# Patient Record
Sex: MALE | Race: WHITE
[De-identification: names, ages, dates, MRNs, and addresses within clinical notes are randomized per-mention and may not be internally consistent; named-entity substitution may affect disease eponyms.]

---

## 2017-01-03 ENCOUNTER — HOSPITAL ENCOUNTER (OUTPATIENT)
Dept: HOSPITAL 17 - NEPC | Age: 52
Setting detail: OBSERVATION
LOS: 1 days | Discharge: HOME | End: 2017-01-04
Attending: INTERNAL MEDICINE | Admitting: INTERNAL MEDICINE
Payer: SELF-PAY

## 2017-01-03 VITALS
RESPIRATION RATE: 15 BRPM | OXYGEN SATURATION: 97 % | SYSTOLIC BLOOD PRESSURE: 183 MMHG | HEART RATE: 104 BPM | DIASTOLIC BLOOD PRESSURE: 91 MMHG

## 2017-01-03 VITALS
DIASTOLIC BLOOD PRESSURE: 108 MMHG | RESPIRATION RATE: 15 BRPM | HEART RATE: 90 BPM | SYSTOLIC BLOOD PRESSURE: 188 MMHG | OXYGEN SATURATION: 97 %

## 2017-01-03 VITALS
RESPIRATION RATE: 12 BRPM | DIASTOLIC BLOOD PRESSURE: 109 MMHG | OXYGEN SATURATION: 99 % | SYSTOLIC BLOOD PRESSURE: 184 MMHG | HEART RATE: 106 BPM | TEMPERATURE: 98.9 F

## 2017-01-03 VITALS
DIASTOLIC BLOOD PRESSURE: 93 MMHG | OXYGEN SATURATION: 98 % | HEART RATE: 90 BPM | TEMPERATURE: 98.2 F | SYSTOLIC BLOOD PRESSURE: 170 MMHG | RESPIRATION RATE: 20 BRPM

## 2017-01-03 VITALS — WEIGHT: 209.44 LBS | BODY MASS INDEX: 31.74 KG/M2 | HEIGHT: 68 IN

## 2017-01-03 VITALS
HEART RATE: 99 BPM | OXYGEN SATURATION: 96 % | SYSTOLIC BLOOD PRESSURE: 214 MMHG | RESPIRATION RATE: 16 BRPM | DIASTOLIC BLOOD PRESSURE: 98 MMHG

## 2017-01-03 VITALS
TEMPERATURE: 98.3 F | DIASTOLIC BLOOD PRESSURE: 70 MMHG | HEART RATE: 88 BPM | SYSTOLIC BLOOD PRESSURE: 140 MMHG | OXYGEN SATURATION: 96 % | RESPIRATION RATE: 20 BRPM

## 2017-01-03 VITALS
SYSTOLIC BLOOD PRESSURE: 177 MMHG | DIASTOLIC BLOOD PRESSURE: 117 MMHG | OXYGEN SATURATION: 98 % | HEART RATE: 101 BPM | RESPIRATION RATE: 15 BRPM

## 2017-01-03 VITALS
HEART RATE: 101 BPM | DIASTOLIC BLOOD PRESSURE: 82 MMHG | OXYGEN SATURATION: 97 % | RESPIRATION RATE: 20 BRPM | SYSTOLIC BLOOD PRESSURE: 157 MMHG

## 2017-01-03 VITALS — OXYGEN SATURATION: 97 %

## 2017-01-03 VITALS — OXYGEN SATURATION: 100 %

## 2017-01-03 VITALS — HEART RATE: 89 BPM

## 2017-01-03 DIAGNOSIS — Z79.899: ICD-10-CM

## 2017-01-03 DIAGNOSIS — F17.200: ICD-10-CM

## 2017-01-03 DIAGNOSIS — F10.10: ICD-10-CM

## 2017-01-03 DIAGNOSIS — R94.31: ICD-10-CM

## 2017-01-03 DIAGNOSIS — R00.2: ICD-10-CM

## 2017-01-03 DIAGNOSIS — R07.9: Primary | ICD-10-CM

## 2017-01-03 DIAGNOSIS — R06.02: ICD-10-CM

## 2017-01-03 DIAGNOSIS — F14.10: ICD-10-CM

## 2017-01-03 DIAGNOSIS — I10: ICD-10-CM

## 2017-01-03 LAB
ALP SERPL-CCNC: 84 U/L (ref 45–117)
ALT SERPL-CCNC: 37 U/L (ref 12–78)
AMPHETAMINE, URINE: (no result)
ANION GAP SERPL CALC-SCNC: 12 MEQ/L (ref 5–15)
APTT BLD: 28.2 SEC (ref 24.3–30.1)
AST SERPL-CCNC: 33 U/L (ref 15–37)
BARBITURATES, URINE: (no result)
BASOPHILS # BLD AUTO: 0.1 TH/MM3 (ref 0–0.2)
BASOPHILS NFR BLD: 1 % (ref 0–2)
BILIRUB SERPL-MCNC: 1 MG/DL (ref 0.2–1)
BUN SERPL-MCNC: 4 MG/DL (ref 7–18)
CHLORIDE SERPL-SCNC: 102 MEQ/L (ref 98–107)
CK MB SERPL-MCNC: 2.1 NG/ML (ref 0.5–3.6)
CK MB SERPL-MCNC: 2.7 NG/ML (ref 0.5–3.6)
CK MB SERPL-MCNC: 2.9 NG/ML (ref 0.5–3.6)
CK SERPL-CCNC: 155 U/L (ref 39–308)
CK SERPL-CCNC: 177 U/L (ref 39–308)
CK SERPL-CCNC: 218 U/L (ref 39–308)
COCAINE UR-MCNC: (no result) NG/ML
EOSINOPHIL # BLD: 0.1 TH/MM3 (ref 0–0.4)
EOSINOPHIL NFR BLD: 1 % (ref 0–4)
ERYTHROCYTE [DISTWIDTH] IN BLOOD BY AUTOMATED COUNT: 14 % (ref 11.6–17.2)
GFR SERPLBLD BASED ON 1.73 SQ M-ARVRAT: 97 ML/MIN (ref 89–?)
HCO3 BLD-SCNC: 23.4 MEQ/L (ref 21–32)
HCT VFR BLD CALC: 44.7 % (ref 39–51)
HEMO FLAGS: (no result)
INR PPP: 1 RATIO
LYMPHOCYTES # BLD AUTO: 2.2 TH/MM3 (ref 1–4.8)
LYMPHOCYTES NFR BLD AUTO: 40.5 % (ref 9–44)
MCH RBC QN AUTO: 31.8 PG (ref 27–34)
MCHC RBC AUTO-ENTMCNC: 34.4 % (ref 32–36)
MCV RBC AUTO: 92.3 FL (ref 80–100)
MONOCYTES NFR BLD: 7.7 % (ref 0–8)
NEUTROPHILS # BLD AUTO: 2.7 TH/MM3 (ref 1.8–7.7)
NEUTROPHILS NFR BLD AUTO: 49.8 % (ref 16–70)
PLATELET # BLD: 263 TH/MM3 (ref 150–450)
POTASSIUM SERPL-SCNC: 3.9 MEQ/L (ref 3.5–5.1)
PROTHROMBIN TIME: 10.7 SEC (ref 9.8–11.6)
RBC # BLD AUTO: 4.85 MIL/MM3 (ref 4.5–5.9)
SODIUM SERPL-SCNC: 137 MEQ/L (ref 136–145)
WBC # BLD AUTO: 5.3 TH/MM3 (ref 4–11)

## 2017-01-03 PROCEDURE — 85025 COMPLETE CBC W/AUTO DIFF WBC: CPT

## 2017-01-03 PROCEDURE — 80320 DRUG SCREEN QUANTALCOHOLS: CPT

## 2017-01-03 PROCEDURE — 85610 PROTHROMBIN TIME: CPT

## 2017-01-03 PROCEDURE — 84484 ASSAY OF TROPONIN QUANT: CPT

## 2017-01-03 PROCEDURE — 80307 DRUG TEST PRSMV CHEM ANLYZR: CPT

## 2017-01-03 PROCEDURE — 82552 ASSAY OF CPK IN BLOOD: CPT

## 2017-01-03 PROCEDURE — 96360 HYDRATION IV INFUSION INIT: CPT

## 2017-01-03 PROCEDURE — 99285 EMERGENCY DEPT VISIT HI MDM: CPT

## 2017-01-03 PROCEDURE — 80053 COMPREHEN METABOLIC PANEL: CPT

## 2017-01-03 PROCEDURE — 85730 THROMBOPLASTIN TIME PARTIAL: CPT

## 2017-01-03 PROCEDURE — G0378 HOSPITAL OBSERVATION PER HR: HCPCS

## 2017-01-03 PROCEDURE — 93017 CV STRESS TEST TRACING ONLY: CPT

## 2017-01-03 PROCEDURE — 71010: CPT

## 2017-01-03 PROCEDURE — 93005 ELECTROCARDIOGRAM TRACING: CPT

## 2017-01-03 PROCEDURE — 82550 ASSAY OF CK (CPK): CPT

## 2017-01-03 NOTE — RADRPT
EXAM DATE/TIME:  01/03/2017 15:52 

 

[facility] COMPARISON:     

No previous studies available for comparison.

 

                     

INDICATIONS :     

Chest pain for two days. 

                     

 

MEDICAL HISTORY :     

None.          

 

SURGICAL HISTORY :     

None.   

 

ENCOUNTER:     

Initial                                        

 

ACUITY:     

2 days      

 

PAIN SCORE:     

5/10

 

LOCATION:     

Bilateral chest 

 

FINDINGS:     

A single view of the chest demonstrates the lungs to be symmetrically aerated without evidence of mas
s, infiltrate or effusion. There is hyperaeration of both lung fields.  The cardiomediastinal contour
s are unremarkable.  Osseous structures are intact.

 

CONCLUSION:     

Normal examination for a patient of this age.  

 

 

 

 Felix Guidry MD on January 03, 2017 at 16:04           

Board Certified Radiologist.

 This report was verified electronically.

## 2017-01-03 NOTE — PD
HPI


Chief Complaint:  Medical Clearance


Time Seen by Provider:  15:37


Travel History


International Travel<30 days:  No


Contact w/Intl Traveler<30days:  No


Traveled to known affect area:  No





History of Present Illness


HPI


51-year-old male presents to the emergency department sent by [facility] 

for evaluation of hypertension.  However, he also states he has been having 

palpitations and chest heaviness for 2 days.  He states it mainly occurs at 

night.  He has associated shortness of breath.  He states this is also ongoing 

now.  Patient does admit to drinking alcohol.  He states he drank "too tall boys

" this morning due to his chest pain.  Patient also admits to using cocaine.  

He states he last snorted cocaine approximate 4-5 days ago.  He states that he 

does have a history of hypertension and is supposed to be on lisinopril, but 

does not take it.  He has reports that he is supposed to be on Lortab and Xanax 

as well as Cymbalta, is not taking medications.  Patient denies history of MI 

or cardiac history.  He denies any suicidal or homicidal ideations.  He states 

that he wants to detox and that is why he wanted to go to [facility].





PFSH


Past Medical History


Blood Disorders:  No


Anxiety:  Yes


Cancer:  No


Cardiovascular Problems:  No


Diminished Hearing:  No


Endocrine:  No


Genitourinary:  No


Hypertension:  Yes


Immune Disorder:  No


Musculoskeletal:  No


Neurologic:  No


Psychiatric:  No


Reproductive:  No


Respiratory:  No





Past Surgical History


Eye Surgery:  Yes (LEFT EYE TEAR DUCT)


Other Surgery:  Yes





Social History


Alcohol Use:  Yes (DAILY)


Tobacco Use:  Yes (2 PPD)


Substance Use:  Yes (ALCOHOL, COCAINE, BENZO)





Allergies-Medications


(Allergen,Severity, Reaction):  


Coded Allergies:  


     No Known Allergies (Verified , 11/15/16)


Reported Meds & Prescriptions





Reported Meds & Active Scripts


Active


Reported


Trazodone (Trazodone HCl) 100 Mg Tab Unknown Dose PO HS


Lisinopril 5 Mg Tab Unknown Dose PO DAILY


Cymbalta DR (Duloxetine HCl) 60 Mg Capdr 60 Mg PO DAILY


Gabapentin 300 Mg Cap 300 Mg PO TID


Vistaril (Hydroxyzine Pamoate) 50 Mg Cap Unknown Dose PO TID








Review of Systems


Except as stated in HPI:  all other systems reviewed are Neg





Physical Exam


Narrative


GENERAL: Well-developed well-nourished male patient, ambulatory.  Afebrile.  

Patient has a strong smell of alcohol on his breath.


SKIN: Warm and dry.


HEAD: Normocephalic.  Atraumatic.


EYES: No scleral icterus. No injection or drainage. 


NECK: Supple, trachea midline. No JVD or lymphadenopathy.


CARDIOVASCULAR: Regular rate and rhythm without murmurs, gallops, or rubs. 


RESPIRATORY: Breath sounds equal bilaterally. No accessory muscle use.  Lungs 

sounds clear to auscultation.


GASTROINTESTINAL: Abdomen soft, non-tender, nondistended. 


MUSCULOSKELETAL: No cyanosis, or edema. 


BACK: Nontender without obvious deformity. No CVA tenderness.





Data


Data


Last Documented VS





Vital Signs








  Date Time  Temp Pulse Resp B/P Pulse Ox O2 Delivery O2 Flow Rate FiO2


 


1/3/17 16:35  90 15 188/108 97 Room Air  


 


1/3/17 14:03 98.9       








Orders





 Electrocardiogram (1/3/17 )


Complete Blood Count With Diff (1/3/17 14:49)


Comprehensive Metabolic Panel (1/3/17 14:49)


Ckmb (Isoenzyme) Profile (1/3/17 14:49)


Troponin I (1/3/17 14:49)


Prothrombin Time / Inr (Pt) (1/3/17 14:49)


Act Partial Throm Time (Ptt) (1/3/17 14:49)


Alcohol (Ethanol) (1/3/17 14:49)


Drug Screen, Random Urine (1/3/17 14:49)


Sodium Chlor 0.9% 1000 Ml Inj (Ns 1000 M (1/3/17 15:45)


Aspirin Chew (Aspirin Chew) (1/3/17 15:45)


Chest, Single Ap (1/3/17 )


Clonidine (Catapres) (1/3/17 16:45)


CKMB (1/3/17 15:25)


CKMB% (1/3/17 15:25)





Labs








 Laboratory Tests








Test 1/3/17





 15:25


 


White Blood Count 5.3 TH/MM3


 


Red Blood Count 4.85 MIL/MM3


 


Hemoglobin 15.4 GM/DL


 


Hematocrit 44.7 %


 


Mean Corpuscular Volume 92.3 FL


 


Mean Corpuscular Hemoglobin 31.8 PG


 


Mean Corpuscular Hemoglobin 34.4 %





Concent 


 


Red Cell Distribution Width 14.0 %


 


Platelet Count 263 TH/MM3


 


Mean Platelet Volume 8.3 FL


 


Neutrophils (%) (Auto) 49.8 %


 


Lymphocytes (%) (Auto) 40.5 %


 


Monocytes (%) (Auto) 7.7 %


 


Eosinophils (%) (Auto) 1.0 %


 


Basophils (%) (Auto) 1.0 %


 


Neutrophils # (Auto) 2.7 TH/MM3


 


Lymphocytes # (Auto) 2.2 TH/MM3


 


Monocytes # (Auto) 0.4 TH/MM3


 


Eosinophils # (Auto) 0.1 TH/MM3


 


Basophils # (Auto) 0.1 TH/MM3


 


CBC Comment DIFF FINAL 


 


Differential Comment  


 


Prothrombin Time 10.7 SEC


 


Prothromb Time International 1.0 RATIO





Ratio 


 


Activated Partial 28.2 SEC





Thromboplast Time 


 


Sodium Level 137 MEQ/L


 


Potassium Level 3.9 MEQ/L


 


Chloride Level 102 MEQ/L


 


Carbon Dioxide Level 23.4 MEQ/L


 


Anion Gap 12 MEQ/L


 


Blood Urea Nitrogen 4 MG/DL


 


Creatinine 0.84 MG/DL


 


Estimat Glomerular Filtration 97 ML/MIN





Rate 


 


Random Glucose 95 MG/DL


 


Calcium Level 9.2 MG/DL


 


Total Bilirubin 1.0 MG/DL


 


Aspartate Amino Transf 33 U/L





(AST/SGOT) 


 


Alanine Aminotransferase 37 U/L





(ALT/SGPT) 


 


Alkaline Phosphatase 84 U/L


 


Total Creatine Kinase 218 U/L


 


Troponin I LESS THAN 0.02





 NG/ML


 


Total Protein 8.5 GM/DL


 


Albumin 4.2 GM/DL


 


Ethyl Alcohol Level 147 MG/DL














MDM


Medical Decision Making


Medical Screen Exam Complete:  Yes


Emergency Medical Condition:  Yes


Medical Record Reviewed:  Yes


Interpretation(s)


chest x-ray - CONCLUSION:     


Normal examination for a patient of this age.


Differential Diagnosis


Anxiety versus alcohol intoxication versus ACS versus electrolyte abnormality


Narrative Course


51-year-old male presents to the emergency department for hypertension, also 

complains of chest heaviness for 2 days.  Patient admits to drinking "too tall 

boys" this morning.  He also admits to snorting cocaine.  CBC, CMP, CK, troponin

, PTT, PT/INR, alcohol level, urine drug screen, EKG, chest x-ray are ordered 

and pending.





EKG shows sinus rhythm, heart rate 99, no STEMI.  CBC is unremarkable.  CMP is 

unremarkable.  CK is 218.  Troponin is less than 0.02.  Coags are unremarkable.

  Alcohol level is 147.  Chest x-ray is normal.  





Patient agrees to chest pain center admission for further evaluation.





Diagnosis





 Primary Impression:  


 Chest pain


 Qualified Code:  R07.9 - Chest pain, unspecified type


 Additional Impression:  


 Hypertension


 Qualified Code:  I10 - Essential hypertension





Admitting Information


Admitting Physician Requests:  Observation








Sherlyn Resendez Kris 3, 2017 15:37

## 2017-01-04 VITALS
DIASTOLIC BLOOD PRESSURE: 98 MMHG | RESPIRATION RATE: 16 BRPM | OXYGEN SATURATION: 99 % | HEART RATE: 88 BPM | TEMPERATURE: 96.5 F | SYSTOLIC BLOOD PRESSURE: 176 MMHG

## 2017-01-04 VITALS
DIASTOLIC BLOOD PRESSURE: 73 MMHG | RESPIRATION RATE: 20 BRPM | TEMPERATURE: 98.3 F | SYSTOLIC BLOOD PRESSURE: 128 MMHG | HEART RATE: 83 BPM | OXYGEN SATURATION: 100 %

## 2017-01-04 VITALS — HEART RATE: 86 BPM

## 2017-01-04 VITALS — HEART RATE: 59 BPM

## 2017-01-04 VITALS — HEART RATE: 90 BPM

## 2017-01-04 VITALS — OXYGEN SATURATION: 99 %

## 2017-01-04 NOTE — HHI.DCPOC
Discharge Care Plan


Diagnosis:  


(1) Chest pain


(2) Hypertension


(3) Substance abuse


(4) Tobacco abuse


Goals to Promote Your Health


* To prevent worsening of your condition and complications


* To maintain your health at the optimal level


Directions to Meet Your Goals


*** Take your medications as prescribed


*** Follow your dietary instruction


*** Follow activity as directed








*** Keep your appointments as scheduled


*** Take your immunizations and boosters as scheduled


*** If your symptoms worsen call your PCP, if no PCP go to Urgent Care Center 

or Emergency Room***


*** Smoking is Dangerous to Your Health. Avoid second hand smoke***


***Call the 24-hour hour crisis hotline for domestic abuse at 1-307.847.8279***








Jesus Chapman Jan 4, 2017 10:30

## 2017-01-04 NOTE — MH
cc:

FRANDY CARNES MD

****

 

DATE OF ADMISSION:  1/3/2017

 

DATE OF BIRTH

1965

 

CHIEF COMPLAINT

Chest pain.

 

HISTORY OF PRESENT ILLNESS

This is a 51-year-old male who presents to the ED complaining of developing

sensation that his heart was jumping with shortness of breath two nights ago.

It lasted about an hour.  He went to [facility] later

that evening and was told there are no beds and so he went back home.  He went

back the next day, which was yesterday, to try to get a bed at [facility] for detox from alcohol and illicit drug use and told him of

the symptoms he had the day before and they told him to go to the ER right

away.  He also states that his blood pressure has been elevated but he has not

had any medicines for several months.  He had no nausea or diaphoresis with his

symptoms.  He denies history of heart disease.

 

PAST MEDICAL HISTORY

Hypertension.

Tobacco abuse.

Denies hyperlipidemia, diabetes, CAD.

 

FAMILY HISTORY

He is unaware of his family history.

 

SOCIAL HISTORY

The patient smokes about a pack a day for more the past year.  Prior to that he

had quit for 30 years.  He abuses alcohol.  When asked how much alcohol he

drinks, he states, "As much as I can get."  When asked to elaborate he would

not give anymore of an answer other than, "As much as I can get."  He also

admits to using cocaine occasionally.  The last time using cocaine was a week

ago.

 

Toxicology screen was positive for cannabinoids and for amphetamines but he

denies either of those at this time.

 

PAST SURGICAL HISTORY

Noncontributory.

 

ALLERGIES

No known drug allergies.

 

MEDICATIONS

Denies.  Was on lisinopril at one time.  He also was taking psychiatric

medications but has not had them for sometime.

 

REVIEW OF SYSTEMS

GENERAL:  Denies fever or chills.  Denies recent illnesses.  HEENT:  Denies

headache or sore throat, difficulty swallowing.  CARDIOVASCULAR:  Describes the

discomfort as mentioned above.  Denies diaphoresis.  He denies sensation of

heart beating rapidly but states it felt like it was jumping.  Denies syncope.

RESPIRATORY:  He was short of breath.  No inspirational chest discomfort.

Denies coughing, wheezing or hemoptysis.

GI:  Denies nausea, vomiting, diarrhea, abdominal pain or blood in the stool.

 

MUSCULOSKELETAL:  Denies joint pain or edema.  Denies calf pain or edema.

NEUROVASCULAR:  Denies headache or dizziness.

ENDOCRINE:  Denies polyuria or polydipsia.

HEMATOLOGIC:  Denies bruising.

SKIN:  Denies rash or itching.

 

PHYSICAL EXAMINATION

VITAL SIGNS: In the emergency department initially with a blood pressure of

184/109, heart rate 106, respiration 12, pulse oximetry 97% on room air and he

was afebrile.  Most recent vital signs include a blood pressure of 176/98,

heart rate was 88, respiratory rate was 16, pulse oximetry 98% on room air and

he was afebrile.

GENERAL:  The patient is seen in the examination room in no apparent stress.

He is pleasant.  He speaks in clear and complete sentences.

HEENT:  Head is atraumatic and normocephalic.

NECK:  Supple without lymphadenopathy and trachea is midline.  No JVD or

carotid bruits.

CARDIOVASCULAR:  Regular rate and rhythm without murmurs, gallops or rubs.

RESPIRATORY:  Lungs are clear to auscultation bilaterally.  No wheezing, rales

or rhonchi.  There is no reproducible chest wall discomforts.  No use of

accessory muscles.

GI:  Abdomen is nontender, nondistended.  Bowel sounds are normal.  No guarding

or rebound.  No obvious pulsatile mass or bruit.  No CVA tenderness.  Strong

femoral pulses bilaterally.  MUSCULOSKELETAL:  Patient moving upper and lower

extremities freely.  No joint tenderness or edema.  No calf tenderness or

edema.  No Homans' sign.  Strong pulses in upper and lower extremities.

NEUROVASCULAR:  The patient is alert and oriented.  Cranial nerves II through

XII grossly intact.  No focal deficits and speech is clear.

SKIN:  No rashes.  Turgor is normal.

 

LABORATORY DATA

CBC is unremarkable.

Coagulation studies are unremarkable.

Complete metabolic panel is unremarkable.

Serial cardiac enzymes normal x 3.

 

X-RAYS

Single view chest x-ray read by radiologist as normal examination for patient's

age.

 

EKGs

Sinus rhythm with sinus tachycardia, sinus rhythm without any significant

segment ST depression or elevation.

 

ASSESSMENT AND PLAN

1. Chest pain:  The patient has had serial cardiac enzymes and EKGs for ruling

     out purposes.  He has been seen by Dr. Frandy Carnes of Cardiology in the

     [facility], will undergo Zak protocol ETT and if that were to be

     unremarkable, will be discharged home with instructions to  follow with a

     local physician.

2. Hypertension:  We will give prescription of lisinopril.

3. Tobacco abuse:  The patient has been counseled on the importance of smoking

     cessation.

4. Substance abuse:  The patient has been advised to try to seek treatment at

     [facility] for his substance abuse issues.

 

The patient is stable at this time.  He is agreeable to this plan.

 

 

Dictated by:  Michele Chapman PA-C

 

 

 

                               __________________________________

                           MD EVER Soriano/BERT

D:  1/4/2017/10:58 AM

T:  1/4/2017/11:33 AM

Visit #:  [id number]

Job #:  [id number]

## 2017-01-04 NOTE — TR
Date Performed: 01/04/2017       Time Performed: 09:58:06

 

DOCTOR:      Issac Carnes 

 

DRUG LIST:     

CLINICAL HISTORY:      CHEST PAIN

REASON FOR TEST:      Chest pain

REASON FOR ENDING:     

OBSERVATION:     

CONCLUSION:      MALCOLM PROTOCOL CONVERTED TO MANUAL SECONDARY TO KNEE PAIN. NO CP. MILD SOB.Maximum H
R=149 % Max HR Achieved=88.0% Maximum VY=969/100 Total Exercise Time=2:59

COMMENTS:      Patient exercised using the Malcolm protocol. No electrocardiographic changes were seen 
to suggest ischemia. Hemodynamic response to exercise was normal. No significant arrhythmia was prese
nt.

## 2017-01-04 NOTE — EKG
Date Performed: 01/03/2017       Time Performed: 21:15:12

 

PTAGE:      51 years

 

EKG:      Sinus rhythm 

 

 ABNORMAL ECG

 

PREVIOUS TRACING       : 01/03/2017 18.13 Since previous tracing, no significant change noted

 

DOCTOR:   Issac Carnes  Interpretating Date/Time  01/04/2017 13:19:35

## 2017-01-04 NOTE — EKG
Date Performed: 01/03/2017       Time Performed: 18:13:45

 

PTAGE:      51 years

 

EKG:      Sinus rhythm 

 

 NORMAL ECG

 

PREVIOUS TRACING       : 01/03/2017 14.34 Since previous tracing, no significant change noted

 

DOCTOR:   Issac Carnes  Interpretating Date/Time  01/04/2017 15:25:52

## 2017-01-04 NOTE — EKG
Date Performed: 01/04/2017       Time Performed: 01:54:52

 

PTAGE:      51 years

 

EKG:      Sinus rhythm 

 

 PROLONGED QT INTERVAL ABNORMAL ECG

 

PREVIOUS TRACING       : 01/03/2017 21.15 Since previous tracing, no significant change noted

 

DOCTOR:   Issac Carnes  Interpretating Date/Time  01/04/2017 13:15:08

## 2017-01-04 NOTE — EKG
Date Performed: 01/03/2017       Time Performed: 14:34:00

 

PTAGE:      51 years

 

EKG:      Sinus rhythm 

 

 ABNORMAL ECG

 

PREVIOUS TRACING       : 04/03/1995 23.46 Since previous tracing, no significant change noted

 

DOCTOR:   Issac Carnes  Interpretating Date/Time  01/04/2017 15:27:47

## 2017-05-20 ENCOUNTER — HOSPITAL ENCOUNTER (EMERGENCY)
Dept: HOSPITAL 17 - NEPD | Age: 52
Discharge: HOME | End: 2017-05-20
Payer: SELF-PAY

## 2017-05-20 VITALS — HEIGHT: 68 IN | BODY MASS INDEX: 26.73 KG/M2 | WEIGHT: 176.37 LBS

## 2017-05-20 VITALS
SYSTOLIC BLOOD PRESSURE: 116 MMHG | DIASTOLIC BLOOD PRESSURE: 62 MMHG | RESPIRATION RATE: 24 BRPM | OXYGEN SATURATION: 100 % | TEMPERATURE: 97.8 F | HEART RATE: 98 BPM

## 2017-05-20 DIAGNOSIS — I10: ICD-10-CM

## 2017-05-20 DIAGNOSIS — M79.651: Primary | ICD-10-CM

## 2017-05-20 DIAGNOSIS — M79.652: ICD-10-CM

## 2017-05-20 DIAGNOSIS — F17.210: ICD-10-CM

## 2017-05-20 PROCEDURE — 99283 EMERGENCY DEPT VISIT LOW MDM: CPT

## 2017-05-20 PROCEDURE — 96372 THER/PROPH/DIAG INJ SC/IM: CPT

## 2017-05-20 NOTE — PD
HPI


Chief Complaint:  Pain: Acute or Chronic


Time Seen by Provider:  17:24


Travel History


International Travel<30 days:  No


Contact w/Intl Traveler<30days:  No


Traveled to known affect area:  No





History of Present Illness


HPI


52-year-old male here for evaluation of groin pain.  The patient is mainly 

complaining of bilateral medial thigh pain that has been going on for the last 

week.  Patient reports that the pain started after having sexual intercourse.  

He denies testicular or scrotal pain.  No urinary symptoms.  Pain is constant, 

moderate, worse with movements and palpation.  He states he works as a , 

and may have aggravated his already painful bilateral thighs a couple days ago.

  No paresthesias or motor deficits.  No penile discharge.  No back pain.  He 

is able to ambulate without assistance.  Chart review shows that the patient 

has been here a couple times in the past intoxicated with alcohol.  Patient 

denies using alcohol today.  He states he drinks on the weekends.  He denies 

illicit drug use or IVDU.





PFSH


Past Medical History


Blood Disorders:  No


Anxiety:  Yes


Cancer:  No


Cardiovascular Problems:  Yes (htn)


Diminished Hearing:  No


Endocrine:  No


Genitourinary:  No


Hypertension:  Yes


Immune Disorder:  No


Musculoskeletal:  No


Neurologic:  No


Psychiatric:  No


Reproductive:  No


Respiratory:  No





Past Surgical History


Eye Surgery:  Yes (LEFT EYE TEAR DUCT)


Other Surgery:  Yes





Social History


Alcohol Use:  No (DENIES ANY ALCOHOL CONSUMPTION )


Tobacco Use:  Yes (2 PPD)


Substance Use:  Yes (ALCOHOL, COCAINE, BENZO)





Allergies-Medications


(Allergen,Severity, Reaction):  


Coded Allergies:  


     No Known Allergies (Verified , 5/20/17)


Reported Meds & Prescriptions





Reported Meds & Active Scripts


Active


Robaxin (Methocarbamol) 500 Mg Tab 500 Mg PO TID


Tramadol (Tramadol HCl) 50 Mg Tab 50 Mg PO Q8H PRN


Lisinopril 10 Mg Tab 10 Mg PO DAILY








Review of Systems


Except as stated in HPI:  all other systems reviewed are Neg





Physical Exam


Narrative


GENERAL: Well-developed, well-nourished, comfortable, no acute distress.


SKIN: Focused skin assessment warm/dry.  No rashes.


HEAD: Atraumatic. Normocephalic. 


EYES: Pupils equal and round. No scleral icterus. No injection or drainage. 


ENT: Mucous membranes pink and moist.


CARDIOVASCULAR: Regular rate and rhythm.  No murmur appreciated.


RESPIRATORY: No accessory muscle use. Clear to auscultation. Breath sounds 

equal bilaterally. 


GASTROINTESTINAL: Abdomen soft, non-tender, nondistended. 


:  Normal exam.  No testicular swelling or masses.  No scrotal edema.  No 

erythema.  No crepitus.  No hernias.


MUSCULOSKELETAL: No obvious deformities. No clubbing.  No cyanosis.  No edema.  

No midline vertebral step-off or tenderness.  Normal muscle strength and range 

of motion in bilateral lower extremities.


NEUROLOGICAL: Awake and alert. No obvious cranial nerve deficits.  Motor 

grossly within normal limits. Normal speech.  Normal motor/sensory in bilateral 

lower extremities.  Great toe extension present bilaterally.


PSYCHIATRIC: Appropriate mood and affect; insight and judgment normal.





Data


Data


Last Documented VS





Vital Signs








  Date Time  Temp Pulse Resp B/P Pulse Ox O2 Delivery O2 Flow Rate FiO2


 


5/20/17 16:56 97.8 98 24 116/62 100 Room Air  








Orders





 Ketorolac Inj (Toradol Inj) (5/20/17 17:30)


Methocarbamol (Robaxin) (5/20/17 17:30)








[facility]


Medical Decision Making


Medical Screen Exam Complete:  Yes


Emergency Medical Condition:  Yes


Differential Diagnosis


Musculoskeletal pain/strain,  pathology unlikely, spinal cord compression 

unlikely, DVT unlikely


Narrative Course


Vital signs reviewed.





Physical exam is remarkable for bilateral thigh tenderness.  Normal  exam.  

Normal muscle strength and sensation in bilateral lower extremities.  All 

compartments supple in bilateral lower extremities.  The patient is most likely 

suffering from musculoskeletal strain.  He was given toradol and will be 

discharged with pain meds and muscle relaxers.  PMD follow-up this week.  He 

was informed on when to return to the ED. He verbalizes understanding and 

agreement with plan.





Diagnosis





 Primary Impression:  


 Thigh pain


 Qualified Code:  M79.651 - Pain in both thighs


Referrals:  


Primary Care Physician


3 days





***Additional Instructions:


Follow-up with a primary care physician this week.


Return to the emergency department for worsening symptoms or any other concerns.


Scripts


Methocarbamol (Robaxin)500 Mg Low542 Mg PO TID  #12 TAB  Ref 0


   Prov:Alberto Gilmore MD         5/20/17 


Tramadol 50 Mg Tab50 Mg PO Q8H PRN (PAIN) #10 TAB  Ref 0


   Prov:Alberto Gilmore MD         5/20/17


Disposition:  01 DISCHARGE HOME


Condition:  Stable








Alberto Gilmore MD May 20, 2017 17:37

## 2017-05-27 ENCOUNTER — HOSPITAL ENCOUNTER (EMERGENCY)
Dept: HOSPITAL 17 - NEPD | Age: 52
Discharge: HOME | End: 2017-05-27
Payer: SELF-PAY

## 2017-05-27 VITALS
RESPIRATION RATE: 16 BRPM | DIASTOLIC BLOOD PRESSURE: 80 MMHG | HEART RATE: 101 BPM | SYSTOLIC BLOOD PRESSURE: 158 MMHG | TEMPERATURE: 98.4 F | OXYGEN SATURATION: 99 %

## 2017-05-27 VITALS — WEIGHT: 187.39 LBS | HEIGHT: 68 IN | BODY MASS INDEX: 28.4 KG/M2

## 2017-05-27 DIAGNOSIS — F17.200: ICD-10-CM

## 2017-05-27 DIAGNOSIS — M79.651: Primary | ICD-10-CM

## 2017-05-27 DIAGNOSIS — R29.898: ICD-10-CM

## 2017-05-27 DIAGNOSIS — Z86.59: ICD-10-CM

## 2017-05-27 DIAGNOSIS — M79.652: ICD-10-CM

## 2017-05-27 DIAGNOSIS — I10: ICD-10-CM

## 2017-05-27 PROCEDURE — 99283 EMERGENCY DEPT VISIT LOW MDM: CPT

## 2017-05-27 NOTE — PD
HPI


.


b/l thigh pain


Chief Complaint:  GI Complaint


Time Seen by Provider:  12:57


Travel History


International Travel<30 days:  No


Contact w/Intl Traveler<30days:  No


Traveled to known affect area:  No





History of Present Illness


HPI


52-year-old male with history of hypertension here with complaints of bilateral 

thigh pain.  Patient was seen approximately a week ago for similar issues.  He 

tells me that he is experiencing excruciating pain in the same area and would 

like something stronger for pain control.  He tells me he is unable to be out 

of work and was hoping that we be able to help him.  He denies any recent 

injuries.  He would like to be examined for possible hernias.  He admits to 

heavy lifting on a daily basis.  He denies any other complaints. He he denies 

any numbness or tingling into his lower extremities.  He denies any bowel or 

bladder dysfunction.  He has no saddle anesthesia.  He denies any shortness of 

breath or leg pains.





PFSH


Past Medical History


Blood Disorders:  No


Anxiety:  Yes


Cancer:  No


Cardiovascular Problems:  Yes (HTN)


Diminished Hearing:  No


Endocrine:  No


Genitourinary:  No


Hypertension:  Yes


Immune Disorder:  No


Musculoskeletal:  No


Neurologic:  No


Psychiatric:  No


Reproductive:  No


Respiratory:  No





Past Surgical History


Eye Surgery:  Yes (LEFT EYE TEAR DUCT)


Other Surgery:  Yes





Social History


Alcohol Use:  No (DENIES ANY ALCOHOL CONSUMPTION )


Tobacco Use:  Yes (2 PPD)


Substance Use:  Yes (ALCOHOL, COCAINE, BENZO)





Allergies-Medications


(Allergen,Severity, Reaction):  


Coded Allergies:  


     No Known Allergies (Verified , 5/20/17)


Reported Meds & Prescriptions





Reported Meds & Active Scripts


Active


Flexeril (Cyclobenzaprine HCl) 10 Mg Tab 10 Mg PO BID


Robaxin (Methocarbamol) 500 Mg Tab 500 Mg PO TID


Tramadol (Tramadol HCl) 50 Mg Tab 50 Mg PO Q8H PRN


Lisinopril 10 Mg Tab 10 Mg PO DAILY








Review of Systems


General / Constitutional:  No: Fever


Eyes:  No: Visual changes


HENT:  No: Headaches


Cardiovascular:  No: Chest Pain or Discomfort


Respiratory:  No: Shortness of Breath


Gastrointestinal:  No: Abdominal Pain


Genitourinary:  No: Dysuria


Musculoskeletal:  Positive: Pain (thigh pain)


Skin:  No Rash


Neurologic:  No: Weakness


Psychiatric:  No: Depression


Endocrine:  No: Polydipsia


Hematologic/Lymphatic:  No: Easy Bruising





Physical Exam


Narrative


GENERAL: AAO x 3, no acute distress, Well-nourished, well-developed patient.


SKIN: Warm and dry. No visible rashes or bruising. 


HEAD: Normocephalic and atraumatic.


EYES: No scleral icterus. No injection or drainage. 


ENT: No nasal drainage noted. Mucous membranes pink. Airway patent. 


NECK: Supple, trachea midline. No JVD.


CARDIOVASCULAR: Regular rate and rhythm without murmurs, gallops, or rubs. 


RESPIRATORY: Breath sounds equal bilaterally. No accessory muscle use. No 

rhonchi or rales. 


GASTROINTESTINAL: Abdomen soft, non-tender, nondistended. 


GROIN: Maggie CHARLES present: no inguinal hernias, no testicular or penile abn. 


EXTREMITIES: No cyanosis or edema. Full ROM b/l LE, 


NEURO: strength b/l LE 5/5, sensation intact 


BACK: Nontender without obvious deformity. No CVA tenderness.


PSYCH: AAO x 3, normal affect.





Data


Data


Last Documented VS





Vital Signs








  Date Time  Temp Pulse Resp B/P Pulse Ox O2 Delivery O2 Flow Rate FiO2


 


5/27/17 12:22 98.4 101 16 158/80 99   











MDM


Medical Decision Making


Medical Screen Exam Complete:  Yes


Emergency Medical Condition:  Yes


Medical Record Reviewed:  Yes


Differential Diagnosis


thigh pain, pulled muscles, lumbar radiculopathy, malingering, drug seeking 

behavior


Narrative Course


52-year-old male here with complaints of thigh pain.


Full examination was done and I do not see any explanation as to why he may 

have this pain.


I discussed these findings with him and then patient came out asking if he 

could get something stronger for pain control.


He tells me he is here requesting stronger medications than what he was 

prescribed last time.  He tells me he did get relief from those medicines, but 

recently ran out.


I explained to him that he will need to establish with a primary care provider 

locally for further workup and treatment.


In the meantime I provided him with some additional muscle relaxers.  I do not 

believe his condition warrants any narcotic pain medications.


I've explained to him that imaging is not indicated.





Diagnosis





 Primary Impression:  


 Thigh pain


 Qualified Code:  M79.651 - Pain in both thighs


 Additional Impression:  


 Pulled muscle


Referrals:  


[facility]


Patient Instructions:  General Instructions





***Additional Instructions:


Please return to emergency department if your symptoms return or worsen. 


Follow up with your primary care provider. 


Take medications as prescribed.





Muscle relaxers can cause drowsiness.  Do not drive, swim or operate heavy 

machinery while using these medications.


***Med/Other Pt SpecificInfo:  Prescription(s) given


Scripts


Cyclobenzaprine (Flexeril)10 Mg Tab10 Mg PO BID  #14 TAB  Ref 0


   Prov:Ale Medeiros MD         5/27/17


Disposition:  01 DISCHARGE HOME


Condition:  Stable








Ashley Carmichael May 27, 2017 12:57

## 2017-06-03 ENCOUNTER — HOSPITAL ENCOUNTER (EMERGENCY)
Dept: HOSPITAL 17 - NEPK | Age: 52
Discharge: HOME | End: 2017-06-03
Payer: SELF-PAY

## 2017-06-03 VITALS — HEART RATE: 125 BPM | OXYGEN SATURATION: 97 % | RESPIRATION RATE: 19 BRPM

## 2017-06-03 VITALS
TEMPERATURE: 98.7 F | SYSTOLIC BLOOD PRESSURE: 132 MMHG | DIASTOLIC BLOOD PRESSURE: 80 MMHG | RESPIRATION RATE: 20 BRPM | OXYGEN SATURATION: 98 % | HEART RATE: 116 BPM

## 2017-06-03 VITALS — WEIGHT: 181.88 LBS | BODY MASS INDEX: 27.57 KG/M2 | HEIGHT: 68 IN

## 2017-06-03 VITALS — RESPIRATION RATE: 16 BRPM

## 2017-06-03 VITALS — HEART RATE: 8 BPM

## 2017-06-03 DIAGNOSIS — I10: ICD-10-CM

## 2017-06-03 DIAGNOSIS — S76.202A: Primary | ICD-10-CM
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
## 2017-06-03 PROCEDURE — 96372 THER/PROPH/DIAG INJ SC/IM: CPT

## 2017-06-03 PROCEDURE — 73552 X-RAY EXAM OF FEMUR 2/>: CPT

## 2017-06-03 PROCEDURE — 99284 EMERGENCY DEPT VISIT MOD MDM: CPT

## 2017-06-03 NOTE — PD
HPI


Chief Complaint:  Lump, Cyst, Hernia


Time Seen by Provider:  10:40


Travel History


International Travel<30 days:  No


Contact w/Intl Traveler<30days:  No


Traveled to known affect area:  No





History of Present Illness


HPI


This is a 52-year-old male presents for evaluation of medial left thigh pain.  

Symptoms started 2 weeks ago.  It is an aching pain that is constant, worse 

when walking.  This is his third visit for evaluation of this pain.  He is 

concerned that he may have a hernia.  He has no pain in the abdomen, testicles, 

scrotum.  The patient reports the pain started after having "rough sex" with a 

"younger woman."  He has had pain since then and it has been aggravated by a 

new job that he started around the same time as a .  He is not currently 

using any medication for symptom relief.  He has been prescribed tramadol, 

muscle relaxant but he has been unable to get any the prescriptions filled.  He 

denies any new injuries in the interim but he has been limping because of the 

pain which is causing some discomfort in his right leg as well.  He admits to 

regular alcohol use.  No other complaints.





PFSH


Past Medical History


Hx Anticoagulant Therapy:  No


Blood Disorders:  No


Anxiety:  Yes


Cancer:  No


Cardiovascular Problems:  Yes (HTN)


Chemotherapy:  No


Cerebrovascular Accident:  No


Diabetes:  No


Diminished Hearing:  No


Endocrine:  No


Genitourinary:  No


Hypertension:  Yes


Immune Disorder:  No


Musculoskeletal:  No


Neurologic:  No


Psychiatric:  No


Reproductive:  No


Respiratory:  No





Past Surgical History


Eye Surgery:  Yes (LEFT EYE TEAR DUCT)


Other Surgery:  Yes





Social History


Alcohol Use:  No


Tobacco Use:  No


Substance Use:  No





Allergies-Medications


(Allergen,Severity, Reaction):  


Coded Allergies:  


     No Known Allergies (Verified , 5/20/17)


Reported Meds & Prescriptions





Reported Meds & Active Scripts


Active


Ibuprofen 800 Mg Tab 800 Mg PO Q6HR PRN


Flexeril (Cyclobenzaprine HCl) 10 Mg Tab 10 Mg PO BID


Robaxin (Methocarbamol) 500 Mg Tab 500 Mg PO TID


Tramadol (Tramadol HCl) 50 Mg Tab 50 Mg PO Q8H PRN


Lisinopril 10 Mg Tab 10 Mg PO DAILY








Review of Systems


Except as stated in HPI:  all other systems reviewed are Neg





Physical Exam


Narrative


GENERAL: Well-developed well-nourished male in no acute distress.  He appears 

anxious.


SKIN: Warm and dry.  There is no rash, no bruising or soft tissue swelling.


HEAD: Atraumatic. Normocephalic. 


EYES: Pupils equal and round. No scleral icterus. No injection or drainage. 


ENT: No nasal bleeding or discharge.  Mucous membranes pink and moist.


NECK: Trachea midline. No JVD. 


CARDIOVASCULAR: Regular rate and rhythm.  No murmur appreciated.


RESPIRATORY: No accessory muscle use. Clear to auscultation. Breath sounds 

equal bilaterally. 


GASTROINTESTINAL: Abdomen soft, non-tender, nondistended. Hepatic and splenic 

margins not palpable. 


: Normal external genitalia.  There is no inguinal hernia.  There is no 

inguinal lymphadenopathy.


MUSCULOSKELETAL: No obvious deformities.  There is tenderness to palpation to 

the medial mid left thigh musculature.  There is no soft tissue swelling or 

palpable masses.  There is pain with adduction of the left leg.  The 

compartments of the left thigh, calf are soft.  There is no lower extremity 

edema.  2+ dorsalis pedis and posterior tibial pulses.


NEUROLOGICAL: Awake and alert. No obvious cranial nerve deficits.  Motor 

grossly within normal limits. Normal speech.





Data


Data


Last Documented VS





Vital Signs








  Date Time  Temp Pulse Resp B/P Pulse Ox O2 Delivery O2 Flow Rate FiO2


 


6/3/17 10:47  125 19  97 Room Air  


 


6/3/17 10:21 98.7   132/80    








Orders








 Femur (Ap & Lat/2vws) (6/3/17 )


Ketorolac Inj (Toradol Inj) (6/3/17 11:00)











MDM


Medical Decision Making


Medical Screen Exam Complete:  Yes


Emergency Medical Condition:  Yes


Medical Record Reviewed:  Yes


Differential Diagnosis


Adductor strain, lateral femoral cutaneous nerve entrapment, pathologic fracture

, compartment syndrome, DVT, intermittent claudication, arterial occlusion


Narrative Course


This is a 52-year-old male who has been having medial left thigh pain ever 

since having what he describes as "rough intercourse with a younger woman."  

Examination reveals that the pain is clearly localized to the medial left thigh 

and reproduced with adduction of the thigh consistent with an adductor strain.  

He was very concerned about the possibly of hernia but is having no abdominal 

or testicular or scrotal pain and there is no palpable hernia or palpable mass 

in the left thigh itself.  There is no evidence of arterial occlusion, DVT.  

This is his third visit for this issue so an x-ray of the left femur will be 

ordered to rule out pathologic fracture.  He reports that his first visit here 

he was given Toradol and this seemed to help significantly with this pain.  He 

will be given Toradol again today.  Unfortunately he has not gotten any of the 

previous prescriptions filled due to financial constraints.  Given his success 

with Toradol, the plan would be to prescribe him generic NSAIDs.


The patient was tachycardic initially in triage.  I rechecked his pulse after x-

ray imaging result.  His pulse was 84.  He feels improved after the 

administration of Toradol.  His x-ray results of and reviewed.  There is 

osteoarthritis in the left hip joint, moderate knee effusion, no hip effusion.  

The radiology report was addended by the radiologist.  Examination reveals some 

slight fullness to left knee joint, nonerythematous, no range of motion 

limitation, no tenderness to palpation.  The patient is being discharged with 

ibuprofen.





Diagnosis





 Primary Impression:  


 Injury of adductor muscle and tendon of left thigh


 Qualified Code:  S76.202A - Injury of adductor muscle and tendon of left thigh

, initial encounter





***Additional Instructions:


Ice pack several times a day 10-15 minutes at a time.  Avoid activities that 

exacerbate pain.  Take ibuprofen with meals.  Follow-up with primary care 

physician in 2 weeks.  Return for any emergent medical conditions.


***Med/Other Pt SpecificInfo:  Prescription(s) given


Scripts


Ibuprofen 800 Mg [id number] Mg PO Q6HR PRN (PAIN) #40 TAB  Ref 0


   Prov:Delma Villarreal DO         6/3/17


Disposition:  01 DISCHARGE HOME


Condition:  Stable








Samy Yao Gerald 3, 2017 10:56

## 2017-06-03 NOTE — RADRPT
EXAM DATE/TIME:  06/03/2017 11:11 

 

CORRECTION

Corrected on: June 03, 2017; 

 

 

 

[facility] COMPARISON:     

No previous studies available for comparison.

 

                     

INDICATIONS :     

Left sided groin pain. Possible pulled muscle two weeks ago.

                     

 

MEDICAL HISTORY :     

None.          

 

SURGICAL HISTORY :     

None.   

 

ENCOUNTER:     

Initial                                        

 

ACUITY:     

2 weeks      

 

PAIN SCORE:     

10/10

 

LOCATION:     

Left  hip.

 

FINDINGS:     

4 views left femur. Moderate-sized osteophytes of the left hip. Mild left hip joint narrowing. Bone a
lignment within normal limits.  No evidence of fracture. Moderate-sized knee joint effusion. Corticat
ed ossicles in the region of the medial collateral ligament of the left knee indicating sequela of ol
d trauma.

 

CONCLUSION:     

No evidence of fracture. Moderate severity osteoarthritic findings of the left hip. Moderate-sized le
ft knee joint effusion.

 

 

 

 Hipolito Hill MD on June 03, 2017 at 11:50           

Board Certified Radiologist.

 This report was verified electronically.  Hipolito Hill MD on June 03, 2017 at 11:59           

Board Certified Radiologist.

 This report was verified electronically.

## 2017-08-22 ENCOUNTER — HOSPITAL ENCOUNTER (EMERGENCY)
Dept: HOSPITAL 17 - NEPD | Age: 52
Discharge: HOME | End: 2017-08-22
Payer: COMMERCIAL

## 2017-08-22 VITALS
RESPIRATION RATE: 20 BRPM | TEMPERATURE: 98.7 F | HEART RATE: 88 BPM | SYSTOLIC BLOOD PRESSURE: 133 MMHG | OXYGEN SATURATION: 100 % | DIASTOLIC BLOOD PRESSURE: 77 MMHG

## 2017-08-22 VITALS — BODY MASS INDEX: 26.73 KG/M2 | WEIGHT: 176.37 LBS | HEIGHT: 68 IN

## 2017-08-22 VITALS — SYSTOLIC BLOOD PRESSURE: 142 MMHG | DIASTOLIC BLOOD PRESSURE: 78 MMHG

## 2017-08-22 DIAGNOSIS — R10.32: Primary | ICD-10-CM

## 2017-08-22 DIAGNOSIS — I10: ICD-10-CM

## 2017-08-22 DIAGNOSIS — F17.290: ICD-10-CM

## 2017-08-22 PROCEDURE — 96372 THER/PROPH/DIAG INJ SC/IM: CPT

## 2017-08-22 PROCEDURE — 99284 EMERGENCY DEPT VISIT MOD MDM: CPT

## 2017-08-22 NOTE — PD
HPI


Chief Complaint:  Abdominal Pain


Time Seen by Provider:  15:41


Travel History


International Travel<30 days:  No


Contact w/Intl Traveler<30days:  No


Traveled to known affect area:  No





History of Present Illness


HPI


This patient reports that he's had a hernia for the last 3 months.  Complains 

of pain in the left groin region.  No injury.  He's been seen here multiple 

times for it.  He said he went to [facility] and they told him he 

had a hernia.  He demands to have his hernia fixed today.  Symptoms severity is 

moderate.





PFSH


Past Medical History


Hx Anticoagulant Therapy:  No


Blood Disorders:  No


Anxiety:  Yes


Cancer:  No


Cardiovascular Problems:  Yes (HTN)


Chemotherapy:  No


Cerebrovascular Accident:  No


Diabetes:  No


Diminished Hearing:  No


Endocrine:  No


Genitourinary:  No


Hypertension:  Yes


Immune Disorder:  No


Musculoskeletal:  No


Neurologic:  No


Psychiatric:  No


Reproductive:  No


Respiratory:  No





Past Surgical History


Eye Surgery:  Yes (LEFT EYE TEAR DUCT)


Other Surgery:  Yes





Social History


Alcohol Use:  Yes


Tobacco Use:  Yes


Substance Use:  No





Allergies-Medications


(Allergen,Severity, Reaction):  


Coded Allergies:  


     No Known Allergies (Verified , 8/22/17)


Reported Meds & Prescriptions





Reported Meds & Active Scripts


Active


Lisinopril 10 Mg Tab 10 Mg PO DAILY








Review of Systems


HENT:  No: Headaches


Cardiovascular:  No: Chest Pain or Discomfort


Gastrointestinal:  No: Vomiting





Physical Exam


Narrative


GASTROINTESTINAL:  Abdomen soft, non-tender, nondistended. Positive bowel 

sounds.  No hepato-splenomegaly, or palpable masses. No guarding.








SKIN: Focused skin assessment reveals no rash or ulcers. Skin is warm and dry.  

Palpation shows no induration or nodules.








: Circumcised penis without lesions.  No inguinal hernia is noted.


No mass lesion.  No erythema or fluctuance or warmth noted.  No testicular 

tenderness.





Data


Data


Last Documented VS





Vital Signs








  Date Time  Temp Pulse Resp B/P (MAP) Pulse Ox O2 Delivery O2 Flow Rate FiO2


 


8/22/17 12:05 98.7 88 20 133/77 (95) 100 Room Air  








Orders





 Orders


Ketorolac Inj (Toradol Inj) (8/22/17 16:15)








[facility]


Medical Decision Making


Medical Screen Exam Complete:  Yes


Emergency Medical Condition:  Yes


Medical Record Reviewed:  Yes


Differential Diagnosis


Groin strain, hernia, colitis


Narrative Course


I have reviewed the patient's electronic medical record.  Reviewed his last 

visit here.  He had a


Negative femur x-ray








All of the Prior visits have yielded an exam that was not suspicious for 

hernia.  I do not detect a hernia on this person.  I gave a diligent exam


There is no sign of infection or fluctuance or erythema or warmth





He is stable for outpatient follow-up.  This is a chronic problem over the last 

3 months.





He should start with family physician follow-up


He wanted an injection I gave him a Toradol injection





Diagnosis





 Primary Impression:  


 Lt groin pain





***Additional Instructions:  


The patient was advised to follow up with their physician and return if they 

worsen.


***Med/Other Pt SpecificInfo:  Other


Disposition:  01 DISCHARGE HOME


Condition:  Stable











Heriberto Eric MD Aug 22, 2017 16:23

## 2017-08-31 ENCOUNTER — HOSPITAL ENCOUNTER (INPATIENT)
Dept: HOSPITAL 17 - NEPC | Age: 52
LOS: 4 days | Discharge: HOME | DRG: 502 | End: 2017-09-04
Attending: HOSPITALIST | Admitting: HOSPITALIST
Payer: COMMERCIAL

## 2017-08-31 VITALS
OXYGEN SATURATION: 100 % | SYSTOLIC BLOOD PRESSURE: 188 MMHG | DIASTOLIC BLOOD PRESSURE: 83 MMHG | TEMPERATURE: 97.1 F | RESPIRATION RATE: 18 BRPM | HEART RATE: 80 BPM

## 2017-08-31 VITALS
SYSTOLIC BLOOD PRESSURE: 140 MMHG | TEMPERATURE: 99.1 F | DIASTOLIC BLOOD PRESSURE: 78 MMHG | RESPIRATION RATE: 20 BRPM | HEART RATE: 100 BPM | OXYGEN SATURATION: 99 %

## 2017-08-31 VITALS
TEMPERATURE: 98.4 F | DIASTOLIC BLOOD PRESSURE: 94 MMHG | RESPIRATION RATE: 20 BRPM | HEART RATE: 84 BPM | SYSTOLIC BLOOD PRESSURE: 153 MMHG | OXYGEN SATURATION: 100 %

## 2017-08-31 VITALS — WEIGHT: 187.39 LBS | HEIGHT: 68 IN | BODY MASS INDEX: 28.4 KG/M2

## 2017-08-31 VITALS — OXYGEN SATURATION: 100 %

## 2017-08-31 DIAGNOSIS — F10.10: ICD-10-CM

## 2017-08-31 DIAGNOSIS — I10: ICD-10-CM

## 2017-08-31 DIAGNOSIS — M65.141: Primary | ICD-10-CM

## 2017-08-31 DIAGNOSIS — B95.62: ICD-10-CM

## 2017-08-31 DIAGNOSIS — L03.011: ICD-10-CM

## 2017-08-31 DIAGNOSIS — F41.9: ICD-10-CM

## 2017-08-31 DIAGNOSIS — F17.210: ICD-10-CM

## 2017-08-31 LAB
ANION GAP SERPL CALC-SCNC: 7 MEQ/L (ref 5–15)
APTT BLD: 29.1 SEC (ref 24.3–30.1)
BASOPHILS # BLD AUTO: 0 TH/MM3 (ref 0–0.2)
BASOPHILS NFR BLD: 0.5 % (ref 0–2)
BUN SERPL-MCNC: 6 MG/DL (ref 7–18)
CHLORIDE SERPL-SCNC: 105 MEQ/L (ref 98–107)
EOSINOPHIL # BLD: 0.1 TH/MM3 (ref 0–0.4)
EOSINOPHIL NFR BLD: 1.2 % (ref 0–4)
ERYTHROCYTE [DISTWIDTH] IN BLOOD BY AUTOMATED COUNT: 12.1 % (ref 11.6–17.2)
GFR SERPLBLD BASED ON 1.73 SQ M-ARVRAT: 110 ML/MIN (ref 89–?)
HCO3 BLD-SCNC: 26.8 MEQ/L (ref 21–32)
HCT VFR BLD CALC: 43.5 % (ref 39–51)
HEMO FLAGS: (no result)
INR PPP: 0.9 RATIO
LYMPHOCYTES # BLD AUTO: 1.8 TH/MM3 (ref 1–4.8)
LYMPHOCYTES NFR BLD AUTO: 25.1 % (ref 9–44)
MCH RBC QN AUTO: 32.5 PG (ref 27–34)
MCHC RBC AUTO-ENTMCNC: 33 % (ref 32–36)
MCV RBC AUTO: 98.6 FL (ref 80–100)
MONOCYTES NFR BLD: 10.6 % (ref 0–8)
NEUTROPHILS # BLD AUTO: 4.6 TH/MM3 (ref 1.8–7.7)
NEUTROPHILS NFR BLD AUTO: 62.6 % (ref 16–70)
PLATELET # BLD: 261 TH/MM3 (ref 150–450)
POTASSIUM SERPL-SCNC: 3.8 MEQ/L (ref 3.5–5.1)
PROTHROMBIN TIME: 10.1 SEC (ref 9.8–11.6)
RBC # BLD AUTO: 4.42 MIL/MM3 (ref 4.5–5.9)
SODIUM SERPL-SCNC: 139 MEQ/L (ref 136–145)
WBC # BLD AUTO: 7.3 TH/MM3 (ref 4–11)

## 2017-08-31 PROCEDURE — 87206 SMEAR FLUORESCENT/ACID STAI: CPT

## 2017-08-31 PROCEDURE — 80202 ASSAY OF VANCOMYCIN: CPT

## 2017-08-31 PROCEDURE — 80053 COMPREHEN METABOLIC PANEL: CPT

## 2017-08-31 PROCEDURE — 82565 ASSAY OF CREATININE: CPT

## 2017-08-31 PROCEDURE — 0JBJ0ZZ EXCISION OF RIGHT HAND SUBCUTANEOUS TISSUE AND FASCIA, OPEN APPROACH: ICD-10-PCS | Performed by: ORTHOPAEDIC SURGERY

## 2017-08-31 PROCEDURE — 87205 SMEAR GRAM STAIN: CPT

## 2017-08-31 PROCEDURE — 87116 MYCOBACTERIA CULTURE: CPT

## 2017-08-31 PROCEDURE — 96365 THER/PROPH/DIAG IV INF INIT: CPT

## 2017-08-31 PROCEDURE — 73140 X-RAY EXAM OF FINGER(S): CPT

## 2017-08-31 PROCEDURE — 93005 ELECTROCARDIOGRAM TRACING: CPT

## 2017-08-31 PROCEDURE — 87102 FUNGUS ISOLATION CULTURE: CPT

## 2017-08-31 PROCEDURE — 86403 PARTICLE AGGLUT ANTBDY SCRN: CPT

## 2017-08-31 PROCEDURE — 0L970ZX DRAINAGE OF RIGHT HAND TENDON, OPEN APPROACH, DIAGNOSTIC: ICD-10-PCS | Performed by: ORTHOPAEDIC SURGERY

## 2017-08-31 PROCEDURE — 85730 THROMBOPLASTIN TIME PARTIAL: CPT

## 2017-08-31 PROCEDURE — 80048 BASIC METABOLIC PNL TOTAL CA: CPT

## 2017-08-31 PROCEDURE — 87015 SPECIMEN INFECT AGNT CONCNTJ: CPT

## 2017-08-31 PROCEDURE — 87070 CULTURE OTHR SPECIMN AEROBIC: CPT

## 2017-08-31 PROCEDURE — 85025 COMPLETE CBC W/AUTO DIFF WBC: CPT

## 2017-08-31 PROCEDURE — 85610 PROTHROMBIN TIME: CPT

## 2017-08-31 PROCEDURE — 87186 SC STD MICRODIL/AGAR DIL: CPT

## 2017-08-31 PROCEDURE — 87147 CULTURE TYPE IMMUNOLOGIC: CPT

## 2017-08-31 RX ADMIN — STANDARDIZED SENNA CONCENTRATE AND DOCUSATE SODIUM SCH TAB: 8.6; 5 TABLET, FILM COATED ORAL at 21:57

## 2017-08-31 RX ADMIN — HYDROCODONE BITARTRATE AND ACETAMINOPHEN PRN TAB: 5; 325 TABLET ORAL at 21:58

## 2017-08-31 RX ADMIN — Medication SCH ML: at 21:00

## 2017-08-31 RX ADMIN — PHENYTOIN SODIUM SCH MLS/HR: 50 INJECTION INTRAMUSCULAR; INTRAVENOUS at 20:10

## 2017-08-31 NOTE — PD
HPI


Chief Complaint:  Skin Problem


Time Seen by Provider:  15:10


Travel History


International Travel<30 days:  No


Contact w/Intl Traveler<30days:  No


Traveled to known affect area:  No





History of Present Illness


HPI


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 

room complaints of right hand thumb pain and swelling.  Patient reports that 2 

days ago, he was working on a deck, reports that the thinks that he may have a 

FB in his right thumb as it is swollen. Reports that he woke up from sleep this 

morning with severe pains to his right thumb.  Patient reports that he is 

unable to flex his thumb, reports a tingling sensation has increased swelling 

throughout his thumb.  Patient's tetanus is up-to-date, denies any fever/

chills.  No other c/o.





PFSH


Past Medical History


Hx Anticoagulant Therapy:  No


Blood Disorders:  No


Anxiety:  Yes


Cancer:  No


Cardiovascular Problems:  Yes (HTN)


Chemotherapy:  No


Cerebrovascular Accident:  No


Diabetes:  No


Diminished Hearing:  No


Endocrine:  No


Genitourinary:  No


Hypertension:  Yes


Immune Disorder:  No


Musculoskeletal:  No


Neurologic:  No


Psychiatric:  No


Reproductive:  No


Respiratory:  No


Tetanus Vaccination:  < 5 Years


Influenza Vaccination:  No





Past Surgical History


Eye Surgery:  Yes (LEFT EYE TEAR DUCT)


Other Surgery:  Yes





Social History


Alcohol Use:  Yes (FEW BEERS DAILY)


Tobacco Use:  Yes (1/4 PPD)


Substance Use:  No





Allergies-Medications


(Allergen,Severity, Reaction):  


Coded Allergies:  


     No Known Allergies (Verified , 8/31/17)


Reported Meds & Prescriptions





Reported Meds & Active Scripts


Active


Lisinopril 10 Mg Tab 10 Mg PO DAILY








Review of Systems


General / Constitutional:  No: Fever


Eyes:  No: Visual changes


HENT:  No: Headaches


Cardiovascular:  No: Chest Pain or Discomfort


Respiratory:  No: Shortness of Breath


Gastrointestinal:  No: Abdominal Pain


Genitourinary:  No: Dysuria


Musculoskeletal:  Positive: Limited ROM (right thumb ), Pain (right thumb)


Skin:  No Rash


Neurologic:  No: Weakness


Psychiatric:  No: Depression


Endocrine:  No: Polydipsia


Hematologic/Lymphatic:  No: Easy Bruising





Physical Exam


Narrative


GENERAL: Well-nourished, well-developed patient.


SKIN: Focused skin assessment warm/dry.


HEAD: Normocephalic.


EYES: No scleral icterus. No injection or drainage. 


NECK: Supple, trachea midline. No JVD or lymphadenopathy.


CARDIOVASCULAR: Regular rate and rhythm without murmurs, gallops, or rubs. 


RESPIRATORY: Breath sounds equal bilaterally. No accessory muscle use.


GASTROINTESTINAL: Abdomen soft, non-tender, nondistended. 


MUSCULOSKELETAL: No cyanosis, or edema to lue


RUE: patient with pain and swelling to right thumb, pain with flexion and 

extension of finger, finger is currently extended, there is tenderness along 

the course of the flexor sheath, symmetric enlargement of the right thumb, 

pulses intact,  neurovascularly intact, there is a pinpoint area of open skin 

to the volar surface of his right thumb


BACK: Nontender without obvious deformity. No CVA tenderness.





Data


Data


Last Documented VS





Vital Signs








  Date Time  Temp Pulse Resp B/P (MAP) Pulse Ox O2 Delivery O2 Flow Rate FiO2


 


8/31/17 14:02 99.1 100 20 140/78 (98) 99 Auto-Vent  








Orders





 Orders


Finger (Ylg3tck) (8/31/17 15:04)


Tetanus/Diphtheria Tox Adult (Tetanus/Di (8/31/17 15:15)


Basic Metabolic Panel (Bmp) (8/31/17 15:42)


Complete Blood Count With Diff (8/31/17 15:42)


Prothrombin Time / Inr (Pt) (8/31/17 15:42)


Act Partial Throm Time (Ptt) (8/31/17 15:42)


Iv Access Insert/Monitor (8/31/17 15:42)


Vancomycin Inj (Vancomycin Inj) (8/31/17 15:45)


Ceftriaxone Inj (Rocephin Inj) (8/31/17 15:45)


Diet Npo (8/31/17 Dinner)


Consult Hand Surgery (8/31/17 )


(Hub Use Only)Inp Phy Cons/Ref (8/31/17 )





Labs





Laboratory Tests








Test


  8/31/17


16:00


 


White Blood Count 7.3 TH/MM3 


 


Red Blood Count 4.42 MIL/MM3 


 


Hemoglobin 14.3 GM/DL 


 


Hematocrit 43.5 % 


 


Mean Corpuscular Volume 98.6 FL 


 


Mean Corpuscular Hemoglobin 32.5 PG 


 


Mean Corpuscular Hemoglobin


Concent 33.0 % 


 


 


Red Cell Distribution Width 12.1 % 


 


Platelet Count 261 TH/MM3 


 


Mean Platelet Volume 7.7 FL 


 


Neutrophils (%) (Auto) 62.6 % 


 


Lymphocytes (%) (Auto) 25.1 % 


 


Monocytes (%) (Auto) 10.6 % 


 


Eosinophils (%) (Auto) 1.2 % 


 


Basophils (%) (Auto) 0.5 % 


 


Neutrophils # (Auto) 4.6 TH/MM3 


 


Lymphocytes # (Auto) 1.8 TH/MM3 


 


Monocytes # (Auto) 0.8 TH/MM3 


 


Eosinophils # (Auto) 0.1 TH/MM3 


 


Basophils # (Auto) 0.0 TH/MM3 


 


CBC Comment DIFF FINAL 


 


Differential Comment  


 


Prothrombin Time 10.1 SEC 


 


Prothromb Time International


Ratio 0.9 RATIO 


 


 


Activated Partial


Thromboplast Time 29.1 SEC 


 


 


Blood Urea Nitrogen 6 MG/DL 


 


Creatinine 0.75 MG/DL 


 


Random Glucose 84 MG/DL 


 


Calcium Level 8.9 MG/DL 


 


Sodium Level 139 MEQ/L 


 


Potassium Level 3.8 MEQ/L 


 


Chloride Level 105 MEQ/L 


 


Carbon Dioxide Level 26.8 MEQ/L 


 


Anion Gap 7 MEQ/L 


 


Estimat Glomerular Filtration


Rate 110 ML/MIN 


 











MDM


Medical Decision Making


Medical Screen Exam Complete:  Yes


Emergency Medical Condition:  Yes


Interpretation(s)





Vital Signs








  Date Time  Temp Pulse Resp B/P (MAP) Pulse Ox O2 Delivery O2 Flow Rate FiO2


 


8/31/17 14:02 99.1 100 20 140/78 (98) 99 Auto-Vent  








Differential Diagnosis


Differential includes finger abscess, finger cellulitis, flexor tenosynovitis


Narrative Course


Patient with concerning findings for flexor tenosynovitis.  He has tenderness 

along the course of the flexor sheath, finger is held extended this time, 

patient is unable to flex his thumb.  call made to hand surgery for evaluation





case reviewed with Dr. Quesada who will see patient in consult





patient has only ate a few pieces of toast this morning.  will keep patient npo





Patient was seen by Dr. Quesada in the ER, plan for OR today, will keep 

patient NPO. Will admit to medicine


CBC & BMP Diagram


8/31/17 16:00








Calcium Level 8.9








Last Impressions








Finger X-Ray 8/31/17 1504 Signed





Impressions: 





 Service Date/Time:  Thursday, August 31, 2017 15:33 - CONCLUSION:  

Unremarkable 





 examination of the right first finger.  No radiopaque foreign body.     Lul Zacarias Jr., MD 





Case reviewed with Dr. Nunez who accepts pt to her service for admission





Diagnosis





 Primary Impression:  


 Flexor tenosynovitis of thumb





Admitting Information


Admitting Physician Requests:  Observation


Condition:  Stable











Delma Villarreal DO Aug 31, 2017 16:06

## 2017-08-31 NOTE — PD.OP
__________________________________________________





Operative Report


Preoperative Diagnosis:  


(1) right thumb infection


Postoperative Diagnosis:  


(1) pyogenic flexor tenosynovitis right thumb


Procedure:


incision and drainage right thumb pulp


flexor tendon sheath drainage right thumb


Anesthesia:


general


Surgeon:


Gregg Quesada


Assistant(s):


hortencia


Operation and Findings:


pocket of pus at the volar aspect of the IP joint of the right thumb


purulence within the flexor tendon sheath IP joint region right thumb











Gregg Quesada MD Aug 31, 2017 20:05

## 2017-08-31 NOTE — PD
Physical Exam


Date Seen by Provider:  Aug 31, 2017


Time Seen by Provider:  15:02


Narrative


52-year-old  male presents the emergency department with pain, swelling

, decreased range of motion in the right thumb as post possible metallic 

foreign body to the right palmar surface of the thumb at the DIP joint.  

Patient states the thumb is now red, swollen, and very painful.  He states he 

feels feverish.  He is up-to-date on his tetanus.  He has decreased pinch 

strength secondary to the swelling and pain.  Patient is right handed.  He has 

no known drug allergies.





Data


Data


Last Documented VS





Vital Signs








  Date Time  Temp Pulse Resp B/P (MAP) Pulse Ox O2 Delivery O2 Flow Rate FiO2


 


8/31/17 14:02 99.1 100 20 140/78 (98) 99 Auto-Vent  











[facility]


Medical Record Reviewed:  Yes


Supervised Visit with MIKAYLA:  Yes


Differential Diagnosis


Right thumb foreign body.  Right thumb cellulitis.  Possible septic joint.


Narrative Course


Patient is felt to be medically stable at time of exam.


X-ray of the right thumb is ordered to rule out foreign body.


Recommend med bed placement for further evaluation and treatment and possible 

hand surgeon consult.


Condition:  Stable











Adarsh Jeff Aug 31, 2017 15:04

## 2017-08-31 NOTE — MB
cc:

ANKIT CAMACHO

****

 

 

DATE OF CONSULTATION

8/31/17

 

REASON FOR CONSULTATION

Right thumb infection.

 

HISTORY OF PRESENT ILLNESS

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

with complaints of right thumb pain and swelling for the past two-to-three

days.  The patient states he was working on a deck about two days ago and

noticed sudden onset of pain over the right thumb region since Monday.  He also

complains of associated swelling and worsening pain with range of motion of the

thumb.  Complains of a puncture type of wound over the right thumb region.

Denies any drainage.  The patient also complains of associated tingling and

numbness of the thumb.  Denies any fever.  The patient also has multiple

abrasions over his other extremities.

 

PAST MEDICAL AND SURGICAL HISTORY

Significant for hypertension

 

PHYSICAL EXAMINATION

The patient is alert, oriented x3.  Examination of right hand/thumb reveals a

puncture wound over the ulnar aspect of the IP joint of the thumb with mild

drainage.  There is also evidence of swelling and redness around the pulp  and

around the puncture site.  Mild tenderness noted on deep palpation over the

proximal volar aspect of the proximal phalanx region corresponding to the

flexor tendon sheath.  Tenderness noted over the IP joint of the thumb on the

volar aspect.  Tenderness also noted along the lateral aspect of the IP joint

of the thumb.  Range of motion of the thumb is limited and painful.  He is able

to actively flex and extend the thumb.  Decreased sensation noted over the pulp

region.  No evidence of compartment syndrome of the pulp noted.  He has intact

capillary refill.

 

LABORATORY DATA

His lab work was reviewed.  He has a white count of 7.3 with neutrophil shift

of 62%.

 

IMAGING STUDIES

X-rays of the right hand were reviewed, shows minimal arthritis involving the

IP joint of the thumb.  No evidence of radiopaque foreign body noted.

 

ASSESSMENT AND PLAN

Patient is a 52-year-old male with infection right thumb IP joint region,

questionable involvement of flexor tendon sheath.

 

Plan will be to keep the patient n.p.o., take him emergently for incision and

drainage, possible drainage of flexor tendon sheath  right thumb.  The patient

is being admitted for IV antibiotics. Continue  with IV antibiotics.

 

 

 

                              _________________________________

                              Ankit Camacho MD SE/

D:  8/31/2017/5:06 PM

T:  8/31/2017/10:01 PM

Visit #:  [id number]

Job #:  [id number]

[email or web address]

## 2017-08-31 NOTE — RADRPT
EXAM DATE/TIME:  08/31/2017 15:33 

 

[facility] COMPARISON:     

No previous studies available for comparison.

 

                     

INDICATIONS :     

Right hand, first digit pain after working on a desk three days ago. Evaluate for foreign body.

                     

 

MEDICAL HISTORY :     

Hypertension.       Smoker.   

 

SURGICAL HISTORY :        

Previous fracture and repair to the third digit.

 

ENCOUNTER:     

Initial                                        

 

ACUITY:     

3 days      

 

PAIN SCORE:     

7/10

 

LOCATION:     

Right  top of thumb.

 

FINDINGS:     

Examination of the first digit of the right hand demonstrates no evidence of fracture or dislocation.
  No radiopaque foreign bodies are seen.  The soft tissues are intact.

 

CONCLUSION:     

Unremarkable examination of the right first finger.  No radiopaque foreign body.

 

 

 

 Lul Zacarias Jr., MD on August 31, 2017 at 16:32           

Board Certified Radiologist.

 This report was verified electronically.

## 2017-08-31 NOTE — HHI.HP
__________________________________________________





HPI


Service


[facility]


Primary Care Physician


No Primary Care Physician


Admission Diagnosis





Flexor Tenosynovitis


Diagnoses:  


Chief Complaint:  


Right thumb pain and swelling


Travel History


International Travel<30 Days:  No


Contact w/Intl Traveler <30 Da:  No


Traveled to Known Affected Are:  No


History of Present Illness


52-year-old male with a past medical history of HTN who presented with right 

thumb pain and swelling.  Patient states that for the past 2 days he has been 

having worsening pain and swelling of his right thumb.  He doesn't recall any 

injury.  He does work as a kohli.  The pain is worse with movement.  He had 

low-grade fevers in the ED, but denies any other fevers or chills.  The pain 

got significantly worse today so he came to the ED for evaluation.  Hand 

surgery was contacted and planning on operative intervention today.  The 

patient denies any other complaints or concerns.  He denies any chest pain, 

shortness of breath, nausea, vomiting.  He denies any problems with alcohol 

withdrawal.





Review of Systems


Except as stated in HPI:  all other systems reviewed are Neg





Past Family Social History


Past Medical History


Hypertension


Past Surgical History


Left eye surgery


Ankle surgery with hardware placement


Reported Medications





Reported Meds & Active Scripts


Active


Lisinopril 10 Mg Tab 10 Mg PO DAILY


Allergies:  


Coded Allergies:  


     No Known Allergies (Verified , 17)


Active Ordered Medications





Current Medications








 Medications


  (Trade)  Dose


 Ordered  Sig/Juan


 Route  Start Time


 Stop Time Status Last Admin


 


 Vancomycin HCl


 1300 mg/Sodium


 Chloride  513 ml @ 


 250 mls/hr  ONCE  ONCE


 IV  17 15:45


 17 17:48   


 


 


  (Prinivil)  10 mg  DAILY


 PO  17 09:00


   UNV  


 


 


 Sodium Chloride  1,000 ml @ 


 100 mls/hr  Q10H


 IV  17 17:02


   UNV  


 


 


  (NS Flush)  2 ml  UNSCH  PRN


 IV FLUSH  17 17:15


   UNV  


 


 


  (NS Flush)  2 ml  BID


 IV FLUSH  17 21:00


   UNV  


 


 


  (Zofran Inj)  4 mg  Q6H  PRN


 IVP  17 17:15


   UNV  


 


 


  (Motrin)  400 mg  Q6H  PRN


 PO  17 17:15


   UNV  


 


 


  (Norco  5-325 Mg)  1 tab  Q4H  PRN


 PO  17 17:15


   UNV  


 


 


  (Morphine Inj)  4 mg  Q3H  PRN


 IV  17 17:15


   UNV  


 


 


  (Narcan Inj)  0.4 mg  UNSCH  PRN


 IV  17 17:15


   UNV  


 


 


  (Estelita-Colace)  1 tab  BID


 PO  17 21:00


   UNV  


 


 


  (Milk Of


 Magnesia Liq)  30 ml  Q12H  PRN


 PO  17 17:15


   UNV  


 


 


  (Senokot)  17.2 mg  Q12H  PRN


 PO  17 17:15


   UNV  


 


 


  (Dulcolax Supp)  10 mg  DAILY  PRN


 RECTAL  17 17:15


   UNV  


 


 


  (Lactulose Liq)  30 ml  DAILY  PRN


 PO  17 17:15


   UNV  


 


 


  (Habitrol 21 Mg


 Patch.24 Hr)  1 patch  DAILY


 T-DERMAL  17 09:00


   UNV  


 


 


 Miscellaneous


 Information  1  DAILY


 T-DERMAL  17 09:00


   UNV  


 


 


  (Folate)  1 mg  DAILY


 PO  17 09:00


 17 08:59 UNV  


 


 


  (Vitamin B1)  100 mg  DAILY


 PO  17 09:00


   UNV  


 


 


  (Theragran M Tab)  1 tab  DAILY


 PO  17 09:00


 17 08:59 UNV  


 


 


  (Catapres)  0.1 mg  Q6H  PRN


 PO  17 17:15


   UNV  


 


 


  (Romazicon Inj)  0.2 mg  Q1M  PRN


 IV PUSH  17 17:15


   UNV  


 


 


  (Ativan)  1 mg  Q4H  PRN


 PO  17 17:15


   UNV  


 


 


  (Ativan)  2 mg  Q2H  PRN


 PO  17 17:15


   UNV  


 


 


  (Ativan Inj)  2 mg  Q1H  PRN


 IV PUSH  17 17:15


   UNV  


 


 


  (Ativan Inj)  2 mg  Q15M  PRN


 IV PUSH  17 17:15


   UNV  


 








Family History


Reviewed, no family history pertinent to current chief complaint.  No heart 

disease or diabetes.


Social History


Smokes 3 or 4 cigarettes a day


Has 4 beers after work daily


Occasional marijuana use, last use 4 days ago





Physical Exam


Vital Signs





Vital Signs








  Date Time  Temp Pulse Resp B/P (MAP) Pulse Ox O2 Delivery O2 Flow Rate FiO2


 


17 14:02 99.1 100 20 140/78 (98) 99 Auto-Vent  








Physical Exam


GENERAL: Well-developed well-nourished.  In no acute distress.


SKIN: Warm and dry.  Right thumb infection.


HEENT: Normocephalic. Pupils equal and round. Mucous membranes pink and moist. 


CARDIOVASCULAR: Regular rate and rhythm.  No murmur appreciated.


RESPIRATORY: No accessory muscle use. Clear to auscultation. Breath sounds 

equal bilaterally. 


GASTROINTESTINAL: Abdomen soft, non-tender, nondistended. Bowel sounds x4.


MUSCULOSKELETAL: Right thumb with significant swelling, erythema.  Unable to 

flex right thumb secondary to pain and swelling.  No clubbing or cyanosis. No 

edema. 


NEUROLOGICAL: Awake and alert. No focal neurological deficits.  Moves upper and 

lower extremities spontaneously. Normal speech.


PSYCHIATRIC: Appropriate mood and affect; insight and judgment normal.


Laboratory





Laboratory Tests








Test


  17


16:00


 


White Blood Count 7.3 


 


Red Blood Count 4.42 


 


Hemoglobin 14.3 


 


Hematocrit 43.5 


 


Mean Corpuscular Volume 98.6 


 


Mean Corpuscular Hemoglobin 32.5 


 


Mean Corpuscular Hemoglobin


Concent 33.0 


 


 


Red Cell Distribution Width 12.1 


 


Platelet Count 261 


 


Mean Platelet Volume 7.7 


 


Neutrophils (%) (Auto) 62.6 


 


Lymphocytes (%) (Auto) 25.1 


 


Monocytes (%) (Auto) 10.6 


 


Eosinophils (%) (Auto) 1.2 


 


Basophils (%) (Auto) 0.5 


 


Neutrophils # (Auto) 4.6 


 


Lymphocytes # (Auto) 1.8 


 


Monocytes # (Auto) 0.8 


 


Eosinophils # (Auto) 0.1 


 


Basophils # (Auto) 0.0 


 


CBC Comment DIFF FINAL 


 


Differential Comment  


 


Prothrombin Time 10.1 


 


Prothromb Time International


Ratio 0.9 


 


 


Activated Partial


Thromboplast Time 29.1 


 


 


Blood Urea Nitrogen 6 


 


Creatinine 0.75 


 


Random Glucose 84 


 


Calcium Level 8.9 


 


Sodium Level 139 


 


Potassium Level 3.8 


 


Chloride Level 105 


 


Carbon Dioxide Level 26.8 


 


Anion Gap 7 


 


Estimat Glomerular Filtration


Rate 110 


 








Result Diagram:  


17 1600                                                                   

             17 1600





Imaging





Last Impressions








Finger X-Ray 17 1504 Signed





Impressions: 





 Service Date/Time:   15:33 - CONCLUSION:  

Unremarkable 





 examination of the right first finger.  No radiopaque foreign body.     Thomas Green Jr., MD Caprini VTE Risk Assessment


Capanupamai VTE Risk Assessment:  No/Low Risk (score <= 1)


Caprini Risk Assessment Model











 Point Value = 1          Point Value = 2  Point Value = 3  Point Value = 5


 


Age 41-60


Minor surgery


BMI > 25 kg/m2


Swollen legs


Varicose veins


Pregnancy or postpartum


History of unexplained or recurrent


   spontaneous 


Oral contraceptives or hormone


   replacement


Sepsis (< 1 month)


Serious lung disease, including


   pneumonia (< 1 month)


Abnormal pulmonary function


Acute myocardial infarction


Congestive heart failure (< 1 month)


History of inflammatory bowel disease


Medical patient at bed rest Age 61-74


Arthroscopic surgery


Major open surgery (> 45 min)


Laparoscopic surgery (> 45 min)


Malignancy


Confined to bed (> 72 hours)


Immobilizing plaster cast


Central venous access Age >= 75


History of VTE


Family history of VTE


Factor V Leiden


Prothrombin 67861T


Lupus anticoagulant


Anticardiolipin antibodies


Elevated serum homocysteine


Heparin-induced thrombocytopenia


Other congenital or acquired


   thrombophilia Stroke (< 1 month)


Elective arthroplasty


Hip, pelvis, or leg fracture


Acute spinal cord injury (< 1 month)








Prophylaxis Regimen











   Total Risk


Factor Score Risk Level Prophylaxis Regimen


 


0-1      Low Early ambulation


 


2 Moderate Order ONE of the following:


*Sequential Compression Device (SCD)


*Heparin 5000 units SQ BID


 


3-4 Higher Order ONE of the following medications:


*Heparin 5000 units SQ TID


*Enoxaparin/Lovenox 40 mg SQ daily (WT < 150 kg, CrCl > 30 mL/min)


*Enoxaparin/Lovenox 30 mg SQ daily (WT < 150 kg, CrCl > 10-29 mL/min)


*Enoxaparin/Lovenox 30 mg SQ BID (WT < 150 kg, CrCl > 30 mL/min)


AND/OR


*Sequential Compression Device (SCD)


 


5 or more Highest Order ONE of the following medications:


*Heparin 5000 units SQ TID (Preferred with Epidurals)


*Enoxaparin/Lovenox 40 mg SQ daily (WT < 150 kg, CrCl > 30 mL/min)


*Enoxaparin/Lovenox 30 mg SQ daily (WT < 150 kg, CrCl > 10-29 mL/min)


*Enoxaparin/Lovenox 30 mg SQ BID (WT < 150 kg, CrCl > 30 mL/min)


AND


*Sequential Compression Device (SCD)











Assessment and Plan


Assessment and Plan


52-year-old male with a past medical history of HTN who presented with right 

thumb pain and swelling





Right thumb cellulitis: Possible tenosynovitis. Tmax 99.1.  No leukocytosis.  

Finger x-ray shows no foreign body.  Discussed with hand surgery, planning on 

operative intervention today, requested continued antibiotics.


-Follow-up and surgery recommendations


-Continue IV vancomycin


-Follow up intraoperative cultures


-Pain control with oral and intravenous narcotics if needed


-Diet per hand surgery, IVF while NPO





Alcohol abuse:/Withdrawals.  Thiamine, folate, multivitamins. CIWA protocol.





Hypertension: Chronic, stable.  Continue home lisinopril.  Clonidine as needed.





Tobacco abuse: Cessation counseling.  Nicotine patch.





DVT prophylaxis: SCDs


Discussed Condition With


Patient, Dr. Nunez, ED staff, Dr. HAHN


Collaborating MD Comments


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  He 

stated that pain and swelling worsened quickly.  Denies any fevers or chills.





GENERAL: in NAD


SKIN: Right thumb with swelling and erythema.  Tenderness palpation.  Decreased 

range of motion secondary to pain.


HEAD: Normocephalic.


EYES: No scleral icterus. No injection or drainage. 


NECK: Supple, trachea midline. No JVD or lymphadenopathy.


CARDIOVASCULAR: Regular rate and rhythm without murmurs, gallops, or rubs. 


RESPIRATORY: Breath sounds equal bilaterally. No accessory muscle use.


GASTROINTESTINAL: Abdomen soft, non-tender, nondistended. 





Right thumb cellulitis


-Patient will be taken to the OR tonight by hand surgeon.


-Will treat with IV antibiotics.


-continue to monitor clinically.





Attestation


The exam, history, and the medical decision-making described in the above note 

were completed with the assistance of the mid-level provider. I reviewed and 

agree with the findings presented.  I attest that I had a face-to-face 

encounter with the patient on the same day, and personally performed and 

documented my assessment and findings in the medical record.











Al Burdick Aug 31, 2017 17:17


Michaela Nunez MD Aug 31, 2017 17:38

## 2017-09-01 VITALS
DIASTOLIC BLOOD PRESSURE: 86 MMHG | SYSTOLIC BLOOD PRESSURE: 152 MMHG | TEMPERATURE: 96.3 F | HEART RATE: 81 BPM | OXYGEN SATURATION: 100 % | RESPIRATION RATE: 17 BRPM

## 2017-09-01 VITALS
OXYGEN SATURATION: 99 % | HEART RATE: 83 BPM | SYSTOLIC BLOOD PRESSURE: 153 MMHG | TEMPERATURE: 96.5 F | DIASTOLIC BLOOD PRESSURE: 74 MMHG | RESPIRATION RATE: 18 BRPM

## 2017-09-01 VITALS
DIASTOLIC BLOOD PRESSURE: 74 MMHG | HEART RATE: 18 BPM | OXYGEN SATURATION: 95 % | TEMPERATURE: 97.6 F | RESPIRATION RATE: 18 BRPM | SYSTOLIC BLOOD PRESSURE: 130 MMHG

## 2017-09-01 VITALS
TEMPERATURE: 96.9 F | DIASTOLIC BLOOD PRESSURE: 70 MMHG | HEART RATE: 84 BPM | OXYGEN SATURATION: 95 % | RESPIRATION RATE: 17 BRPM | SYSTOLIC BLOOD PRESSURE: 151 MMHG

## 2017-09-01 VITALS
SYSTOLIC BLOOD PRESSURE: 167 MMHG | HEART RATE: 75 BPM | DIASTOLIC BLOOD PRESSURE: 83 MMHG | RESPIRATION RATE: 18 BRPM | OXYGEN SATURATION: 97 % | TEMPERATURE: 97.4 F

## 2017-09-01 VITALS
HEART RATE: 71 BPM | RESPIRATION RATE: 18 BRPM | TEMPERATURE: 96 F | OXYGEN SATURATION: 96 % | SYSTOLIC BLOOD PRESSURE: 154 MMHG | DIASTOLIC BLOOD PRESSURE: 82 MMHG

## 2017-09-01 LAB
ALP SERPL-CCNC: 72 U/L (ref 45–117)
ALT SERPL-CCNC: 17 U/L (ref 12–78)
ANION GAP SERPL CALC-SCNC: 10 MEQ/L (ref 5–15)
AST SERPL-CCNC: 14 U/L (ref 15–37)
BASOPHILS # BLD AUTO: 0 TH/MM3 (ref 0–0.2)
BASOPHILS NFR BLD: 0.7 % (ref 0–2)
BILIRUB SERPL-MCNC: 0.7 MG/DL (ref 0.2–1)
BUN SERPL-MCNC: 7 MG/DL (ref 7–18)
CHLORIDE SERPL-SCNC: 107 MEQ/L (ref 98–107)
EOSINOPHIL # BLD: 0.1 TH/MM3 (ref 0–0.4)
EOSINOPHIL NFR BLD: 1.1 % (ref 0–4)
ERYTHROCYTE [DISTWIDTH] IN BLOOD BY AUTOMATED COUNT: 11.8 % (ref 11.6–17.2)
GFR SERPLBLD BASED ON 1.73 SQ M-ARVRAT: 121 ML/MIN (ref 89–?)
HCO3 BLD-SCNC: 23.3 MEQ/L (ref 21–32)
HCT VFR BLD CALC: 38.3 % (ref 39–51)
HEMO FLAGS: (no result)
LYMPHOCYTES # BLD AUTO: 1.9 TH/MM3 (ref 1–4.8)
LYMPHOCYTES NFR BLD AUTO: 29.8 % (ref 9–44)
MCH RBC QN AUTO: 32.9 PG (ref 27–34)
MCHC RBC AUTO-ENTMCNC: 33.4 % (ref 32–36)
MCV RBC AUTO: 98.6 FL (ref 80–100)
MONOCYTES NFR BLD: 9.8 % (ref 0–8)
NEUTROPHILS # BLD AUTO: 3.7 TH/MM3 (ref 1.8–7.7)
NEUTROPHILS NFR BLD AUTO: 58.6 % (ref 16–70)
PLATELET # BLD: 216 TH/MM3 (ref 150–450)
POTASSIUM SERPL-SCNC: 3.7 MEQ/L (ref 3.5–5.1)
RBC # BLD AUTO: 3.89 MIL/MM3 (ref 4.5–5.9)
SODIUM SERPL-SCNC: 140 MEQ/L (ref 136–145)
WBC # BLD AUTO: 6.3 TH/MM3 (ref 4–11)

## 2017-09-01 RX ADMIN — SODIUM CHLORIDE SCH MLS/HR: 900 INJECTION INTRAVENOUS at 17:26

## 2017-09-01 RX ADMIN — HYDROCODONE BITARTRATE AND ACETAMINOPHEN PRN TAB: 5; 325 TABLET ORAL at 04:02

## 2017-09-01 RX ADMIN — FOLIC ACID SCH MG: 1 TABLET ORAL at 09:07

## 2017-09-01 RX ADMIN — SODIUM CHLORIDE SCH MLS/HR: 900 INJECTION INTRAVENOUS at 03:52

## 2017-09-01 RX ADMIN — LISINOPRIL SCH MG: 10 TABLET ORAL at 09:08

## 2017-09-01 RX ADMIN — STANDARDIZED SENNA CONCENTRATE AND DOCUSATE SODIUM SCH TAB: 8.6; 5 TABLET, FILM COATED ORAL at 21:00

## 2017-09-01 RX ADMIN — STANDARDIZED SENNA CONCENTRATE AND DOCUSATE SODIUM SCH TAB: 8.6; 5 TABLET, FILM COATED ORAL at 09:07

## 2017-09-01 RX ADMIN — PHENYTOIN SODIUM SCH MLS/HR: 50 INJECTION INTRAMUSCULAR; INTRAVENOUS at 13:21

## 2017-09-01 RX ADMIN — Medication SCH ML: at 09:00

## 2017-09-01 RX ADMIN — Medication SCH MG: at 09:08

## 2017-09-01 RX ADMIN — PHENYTOIN SODIUM SCH MLS/HR: 50 INJECTION INTRAMUSCULAR; INTRAVENOUS at 03:51

## 2017-09-01 RX ADMIN — HYDROCODONE BITARTRATE AND ACETAMINOPHEN PRN TAB: 5; 325 TABLET ORAL at 09:09

## 2017-09-01 RX ADMIN — HYDROCODONE BITARTRATE AND ACETAMINOPHEN PRN TAB: 5; 325 TABLET ORAL at 21:39

## 2017-09-01 RX ADMIN — HYDROCODONE BITARTRATE AND ACETAMINOPHEN PRN TAB: 5; 325 TABLET ORAL at 13:16

## 2017-09-01 RX ADMIN — MULTIPLE VITAMINS W/ MINERALS TAB SCH TAB: TAB at 09:07

## 2017-09-01 RX ADMIN — NICOTINE SCH PATCH: 21 PATCH, EXTENDED RELEASE TOPICAL at 09:08

## 2017-09-01 RX ADMIN — HYDROCODONE BITARTRATE AND ACETAMINOPHEN PRN TAB: 5; 325 TABLET ORAL at 17:26

## 2017-09-01 RX ADMIN — Medication SCH ML: at 21:38

## 2017-09-01 NOTE — HHI.PR
Subjective


Remarks


Pt has pain in his hand and would like some ativan and pain medication. Denies 

any CP/SOB/N/V


States that he feels tired.





Objective


Vitals





Vital Signs








  Date Time  Temp Pulse Resp B/P (MAP) Pulse Ox O2 Delivery O2 Flow Rate FiO2


 


9/1/17 12:00 96.9 84 17 151/70 (97) 95   


 


9/1/17 08:00 97.3 71 18 154/89 (110) 96   


 


9/1/17 08:00 97.3 71 18 154/89 (110) 97   


 


9/1/17 04:00 97.6 18 18 130/74 (92) 95   


 


9/1/17 00:00 96.5 83 18 153/74 (100) 99   


 


8/31/17 20:31   16     


 


8/31/17 20:30  83 16 147/81 (103) 98 Room Air  


 


8/31/17 20:15  79 16 142/85 (104) 97 Room Air  


 


8/31/17 20:08   16     


 


8/31/17 20:00  72 16 152/87 (108) 99 Room Air  


 


8/31/17 20:00 97.1 80 18 188/83 (118) 100   


 


8/31/17 19:45  73 16 160/92 (114) 100 Room Air  


 


8/31/17 19:36 98.5 73 16 168/87 (114) 99 Room Air  


 


8/31/17 18:01        


 


8/31/17 17:33 98.4 84 20 153/94 (113) 100 Room Air  


 


8/31/17 17:14     100   21














I/O      


 


 8/31/17 8/31/17 8/31/17 9/1/17 9/1/17 9/1/17





 07:00 15:00 23:00 07:00 15:00 23:00


 


Intake Total   306 ml 1232 ml  


 


Output Total   400 ml 1000 ml  


 


Balance   -94 ml 232 ml  


 


      


 


Intake Oral    720 ml  


 


IV Total   306 ml 512 ml  


 


Output Urine Total   400 ml 1000 ml  








Result Diagram:  


9/1/17 0436                                                                    

            9/1/17 0436





Imaging


 


Last Impressions








Finger X-Ray 8/31/17 1504 Signed





Impressions: 





 Service Date/Time:  Thursday, August 31, 2017 15:33 - CONCLUSION:  

Unremarkable 





 examination of the right first finger.  No radiopaque foreign body.     Lul Zacarias Jr., MD 








Objective Remarks


GENERAL: Well-developed well-nourished. laying in bed appears tired.


SKIN:  Right hand w dressing in place. d/c/i


HEENT: EOMI, trachea midline


CARDIOVASCULAR: Regular rate and rhythm.  No murmur appreciated.


RESPIRATORY: No accessory muscle use. Clear to auscultation. Breath sounds 

equal bilaterally. 


GASTROINTESTINAL: Abdomen soft, non-tender, nondistended. Bowel sounds x4.


MUSCULOSKELETAL:no edema noted in lower extremities, moves all extremities.


NEUROLOGICAL: Awake and alert. No focal neurological deficits. Normal speech.


PSYCHIATRIC: Appropriate mood and affect; insight and judgment normal.





A/P


Assessment and Plan


52-year-old male with a past medical history of HTN who presented with right 

thumb pain and swelling





Right thumb cellulitis: s/p I&D of the right thumb and flexor tendon sheath 

drainage.


-wound cx growing MRSA, continue to follow fpr sensitivities. Will consult ID 

as well for assistance. 


-Continue IV vancomycin


-Management per hand sx.


-Pain control with oral and intravenous narcotics if needed





Alcohol abuse:/Withdrawals.  Thiamine, folate, multivitamins. [facility] protocol.





Hypertension: Chronic, stable.  Continue home lisinopril.  Clonidine as needed.





Tobacco abuse: Cessation counseling.  Nicotine patch.





DVT prophylaxis: SCDs


Discharge Planning


ID consult in place


awaiting clearance from consultants.











Buffy Langford MD Sep 1, 2017 16:25

## 2017-09-01 NOTE — HHI.PR
Subjective


Remarks


complains of pain over the right thumb region


denies any fever


complaint with limb elevation





Objective





Vital Signs








  Date Time  Temp Pulse Resp B/P (MAP) Pulse Ox O2 Delivery O2 Flow Rate FiO2


 


9/1/17 08:00 97.3 71 18 154/89 (110) 96   


 


9/1/17 08:00 97.3 71 18 154/89 (110) 97   


 


9/1/17 04:00 97.6 18 18 130/74 (92) 95   


 


9/1/17 00:00 96.5 83 18 153/74 (100) 99   


 


8/31/17 20:31   16     


 


8/31/17 20:30  83 16 147/81 (103) 98 Room Air  


 


8/31/17 20:15  79 16 142/85 (104) 97 Room Air  


 


8/31/17 20:08   16     


 


8/31/17 20:00  72 16 152/87 (108) 99 Room Air  


 


8/31/17 20:00 97.1 80 18 188/83 (118) 100   


 


8/31/17 19:45  73 16 160/92 (114) 100 Room Air  


 


8/31/17 19:36 98.5 73 16 168/87 (114) 99 Room Air  


 


8/31/17 18:01        


 


8/31/17 17:33 98.4 84 20 153/94 (113) 100 Room Air  


 


8/31/17 17:14     100   21


 


8/31/17 14:02 99.1 100 20 140/78 (98) 99 Auto-Vent  














I/O      


 


 8/31/17 8/31/17 8/31/17 9/1/17 9/1/17 9/1/17





 07:00 15:00 23:00 07:00 15:00 23:00


 


Intake Total   306 ml 1232 ml  


 


Output Total   400 ml 1000 ml  


 


Balance   -94 ml 232 ml  


 


      


 


Intake Oral    720 ml  


 


IV Total   306 ml 512 ml  


 


Output Urine Total   400 ml 1000 ml  











examination of right thumb:





packing in place


swelling and redness of the thumb noted


no evidence of compartment syndrome of the pulp noted


intact capillary


decreased sensation over the pulp.





gram stain: gram positive cocci in pairs


Result Diagram:  


9/1/17 0436                                                                    

            9/1/17 0436








Assessment and Plan


Assessment and Plan


52 year old male with pyogenic flexor tenosynovitis right thumb s/p drainage





Plan:





surrounding skin is cleaned with alcohol wipes


packing pulled out a little bit


dry dressing applied


continue with IV antibiotics


follow up cultures


limb elevation and range of motion exercises


hand surgery will follow.











Gregg Quesada MD Sep 1, 2017 12:53

## 2017-09-02 VITALS
HEART RATE: 88 BPM | OXYGEN SATURATION: 98 % | DIASTOLIC BLOOD PRESSURE: 86 MMHG | RESPIRATION RATE: 16 BRPM | TEMPERATURE: 98.2 F | SYSTOLIC BLOOD PRESSURE: 146 MMHG

## 2017-09-02 VITALS — OXYGEN SATURATION: 98 %

## 2017-09-02 VITALS
OXYGEN SATURATION: 98 % | RESPIRATION RATE: 21 BRPM | SYSTOLIC BLOOD PRESSURE: 162 MMHG | DIASTOLIC BLOOD PRESSURE: 74 MMHG | HEART RATE: 63 BPM | TEMPERATURE: 97.6 F

## 2017-09-02 VITALS
HEART RATE: 63 BPM | OXYGEN SATURATION: 99 % | TEMPERATURE: 95.5 F | DIASTOLIC BLOOD PRESSURE: 89 MMHG | RESPIRATION RATE: 17 BRPM | SYSTOLIC BLOOD PRESSURE: 170 MMHG

## 2017-09-02 VITALS
DIASTOLIC BLOOD PRESSURE: 91 MMHG | HEART RATE: 89 BPM | OXYGEN SATURATION: 99 % | SYSTOLIC BLOOD PRESSURE: 164 MMHG | TEMPERATURE: 97.4 F | RESPIRATION RATE: 17 BRPM

## 2017-09-02 VITALS
OXYGEN SATURATION: 99 % | TEMPERATURE: 96.2 F | DIASTOLIC BLOOD PRESSURE: 90 MMHG | SYSTOLIC BLOOD PRESSURE: 137 MMHG | RESPIRATION RATE: 17 BRPM | HEART RATE: 89 BPM

## 2017-09-02 LAB
GFR SERPLBLD BASED ON 1.73 SQ M-ARVRAT: 117 ML/MIN (ref 89–?)
VANCOMYCIN TROUGH SERPL-MCNC: 7.9 MCG/ML (ref 5–10)

## 2017-09-02 RX ADMIN — PHENYTOIN SODIUM SCH MLS/HR: 50 INJECTION INTRAMUSCULAR; INTRAVENOUS at 00:00

## 2017-09-02 RX ADMIN — SODIUM CHLORIDE SCH MLS/HR: 900 INJECTION INTRAVENOUS at 17:24

## 2017-09-02 RX ADMIN — PHENYTOIN SODIUM SCH MLS/HR: 50 INJECTION INTRAMUSCULAR; INTRAVENOUS at 10:00

## 2017-09-02 RX ADMIN — HYDROCODONE BITARTRATE AND ACETAMINOPHEN PRN TAB: 5; 325 TABLET ORAL at 16:05

## 2017-09-02 RX ADMIN — Medication SCH MG: at 07:58

## 2017-09-02 RX ADMIN — PHENYTOIN SODIUM SCH MLS/HR: 50 INJECTION INTRAMUSCULAR; INTRAVENOUS at 20:00

## 2017-09-02 RX ADMIN — Medication SCH ML: at 08:00

## 2017-09-02 RX ADMIN — STANDARDIZED SENNA CONCENTRATE AND DOCUSATE SODIUM SCH TAB: 8.6; 5 TABLET, FILM COATED ORAL at 07:58

## 2017-09-02 RX ADMIN — HYDROCODONE BITARTRATE AND ACETAMINOPHEN PRN TAB: 5; 325 TABLET ORAL at 10:47

## 2017-09-02 RX ADMIN — STANDARDIZED SENNA CONCENTRATE AND DOCUSATE SODIUM SCH TAB: 8.6; 5 TABLET, FILM COATED ORAL at 21:16

## 2017-09-02 RX ADMIN — SODIUM CHLORIDE SCH MLS/HR: 900 INJECTION INTRAVENOUS at 04:55

## 2017-09-02 RX ADMIN — HYDROCODONE BITARTRATE AND ACETAMINOPHEN PRN TAB: 5; 325 TABLET ORAL at 06:23

## 2017-09-02 RX ADMIN — FOLIC ACID SCH MG: 1 TABLET ORAL at 07:58

## 2017-09-02 RX ADMIN — HYDROCODONE BITARTRATE AND ACETAMINOPHEN PRN TAB: 5; 325 TABLET ORAL at 21:18

## 2017-09-02 RX ADMIN — NICOTINE SCH PATCH: 21 PATCH, EXTENDED RELEASE TOPICAL at 07:59

## 2017-09-02 RX ADMIN — PHENYTOIN SODIUM SCH MLS/HR: 50 INJECTION INTRAMUSCULAR; INTRAVENOUS at 02:10

## 2017-09-02 RX ADMIN — LISINOPRIL SCH MG: 10 TABLET ORAL at 07:58

## 2017-09-02 RX ADMIN — HYDROCODONE BITARTRATE AND ACETAMINOPHEN PRN TAB: 5; 325 TABLET ORAL at 02:09

## 2017-09-02 RX ADMIN — Medication SCH ML: at 21:18

## 2017-09-02 RX ADMIN — MULTIPLE VITAMINS W/ MINERALS TAB SCH TAB: TAB at 07:58

## 2017-09-02 RX ADMIN — GABAPENTIN SCH MG: 300 CAPSULE ORAL at 17:25

## 2017-09-02 NOTE — HHI.PR
Subjective


Remarks


Pt states that the pain in his right hand is improving. Still has difficulty w 

moving his fingers. Denies any CP/SOB/N/V





Objective


Vitals





Vital Signs








  Date Time  Temp Pulse Resp B/P (MAP) Pulse Ox O2 Delivery O2 Flow Rate FiO2


 


9/2/17 12:00 98.2 88 16 146/86 (106) 98   


 


9/2/17 08:00 95.5 63 17 170/89 (116) 99   


 


9/2/17 07:55   16     


 


9/2/17 00:00 96.2 89 17 137/90 (106) 99   


 


9/1/17 20:00 96.3 81 17 152/86 (108) 100   


 


9/1/17 18:16        21


 


9/1/17 16:00 97.4 75 18 167/83 (111) 97   














I/O      


 


 9/1/17 9/1/17 9/1/17 9/2/17 9/2/17 9/2/17





 06:59 14:59 22:59 06:59 14:59 22:59


 


Intake Total 1232 ml  1562.5 ml 647 ml 250 ml 


 


Output Total 1000 ml  400 ml   


 


Balance 232 ml  1162.5 ml 647 ml 250 ml 


 


      


 


Intake Oral 720 ml  900 ml   


 


IV Total 512 ml  662.5 ml 647 ml 250 ml 


 


Output Urine Total 1000 ml  400 ml   


 


# Voids   4   


 


# Bowel Movements   1   








Result Diagram:  


9/1/17 0436                                                                    

            9/2/17 0445





Imaging





Last Impressions








Finger X-Ray 8/31/17 1504 Signed





Impressions: 





 Service Date/Time:  Thursday, August 31, 2017 15:33 - CONCLUSION:  

Unremarkable 





 examination of the right first finger.  No radiopaque foreign body.     Lul Zacarias Jr., MD 








Objective Remarks


GENERAL: Well-developed well-nourished. 


SKIN:  Right hand w dressing in place. d/c/i


HEENT: EOMI, trachea midline


CARDIOVASCULAR: Regular rate and rhythm.  No murmur appreciated.


RESPIRATORY: No accessory muscle use. Clear to auscultation. Breath sounds 

equal bilaterally. 


GASTROINTESTINAL: Abdomen soft, non-tender, nondistended. Bowel sounds x4.


MUSCULOSKELETAL:no edema noted in lower extremities, moves all extremities.


NEUROLOGICAL: Awake and alert. No focal neurological deficits. Normal speech.


PSYCHIATRIC: Appropriate mood and affect; insight and judgment normal.





A/P


Assessment and Plan


52-year-old male with a past medical history of HTN who presented with right 

thumb pain and swelling





Right thumb cellulitis: s/p I&D of the right thumb and flexor tendon sheath 

drainage POD 2


-wound cx growing MRSA. ID consulted. appreciate assistance. 


-Continue IV vancomycin for now


-Management per hand sx.


-Pain control with oral and intravenous narcotics if needed


-Elevate arm per Hand sx recs





Alcohol abuse:/Withdrawals.  Thiamine, folate, multivitamins. CIWA protocol.





Hypertension: Chronic, stable.  Continue home lisinopril.  Clonidine as needed.





Tobacco abuse: Cessation counseling.  Nicotine patch.





DVT prophylaxis: SCDs


Discharge Planning


awaiting clearance from consultants.


continue pain control











Buffy Langford MD Sep 2, 2017 13:45

## 2017-09-02 NOTE — EKG
Date Performed: 08/31/2017       Time Performed: 17:43:03

 

PTAGE:      52 years

 

EKG:      Sinus rhythm 

 

 NORMAL ECG

 

PREVIOUS TRACING       : 01/04/2017 01.54 Compared to the previous tracing, QT no longer prolonged

 

DOCTOR:   Real [place]  Interpretating Date/Time  09/02/2017 01:03:31

## 2017-09-02 NOTE — PD.CONS
History of Present Illness


Service


Infectious disease


Consult Requested By


Dr WILLY Langford


Reason for Consult


Evaluate patient with MRSA infection of the thumb


Primary Care Physician


No Primary Care Physician


Diagnoses:  


History of Present Illness


Patient seen and examined.  Records reviewed.  





Patient is a 52-year-old male, presented to the hospital complaining of 2 day 

history of worsening pain and swelling of his right thumb.  Patient was 

apparently working on a deck the day prior to onset of his symptoms.  He really 

did not recall any injury, but he was concerned that he might have had some 

kind of a splinter.  2 days prior to admission he noted swelling and pain on 

his right thumb, and it progressively worsened that he is not really been able 

to move well his right hand.  He's had some subjective fevers.  Denies any 

other symptoms as far as respiratory, GI or any urinary complaints.  Patient 

has been admitted, and he has not been febrile.  WBC is normal.  Hand surgery 

saw the patient, and did I&D of his right thumb.  Culture is growing MRSA.  

Imaging study did not reveal any foreign body.





Infectious disease consultation has been requested to evaluate the patient.





Review of Systems


Constitutional:  DENIES: Fever, Chills


Eyes:  DENIES: Eye pain


Ears, nose, mouth, throat:  DENIES: Nasal discharge, Oral lesions, Throat pain, 

Ear Pain, Sinus Pain


Respiratory:  DENIES: Cough, Shortness of breath


Cardiovascular:  DENIES: Chest pain, Palpitations


Gastrointestinal:  DENIES: Abdominal pain, Diarrhea, Nausea, Vomiting


Genitourinary:  DENIES: Urgency, Hematuria, Dysuria


Musculoskeletal:  COMPLAINS OF: Joint pain, Joint Swelling, DENIES: Neck pain


Integumentary:  DENIES: Rash


Neurologic:  DENIES: Headache, Localized weakness


Psychiatric:  DENIES: Confusion, Hallucinations





Past Family Social History


Allergies:  


Coded Allergies:  


     No Known Allergies (Verified , 8/31/17)


Past Medical History


Hypertension


Past Surgical History


Left eye surgery


Ankle surgery with hardware placement


Active Ordered Medications


Norco


Dulcolax


Clonidine


Folic acid


Motrin


Lactulose


Prinivil


Ativan


MOM


Morphine


MVI


Nicotine


Zofran


Estelita-Colace


Senokot


Thiamine


Vancomycin


Family History


Unremarkable


Social History


Smokes 3 or 4 cigarettes a day


Has 4 beers after work daily


Occasional marijuana use, last use 4 days ago


Denies illicit drug use





Physical Exam


Vital Signs





Vital Signs








  Date Time  Temp Pulse Resp B/P (MAP) Pulse Ox O2 Delivery O2 Flow Rate FiO2


 


9/2/17 08:00 95.5 63 17 170/89 (116) 99   


 


9/2/17 07:55   16     


 


9/2/17 00:00 96.2 89 17 137/90 (106) 99   


 


9/1/17 20:00 96.3 81 17 152/86 (108) 100   


 


9/1/17 18:16        21


 


9/1/17 16:00 97.4 75 18 167/83 (111) 97   








Physical Exam


GENERAL:   Patient is a well-nourished, well-developed male,   awake and alert,

   not in respiratory distress.


SKIN: Warm and dry.  No generalized rash, no ecchymoses and no evidence of 

embolic lesions.


HEAD:   Atraumatic. Normocephalic.  No temporal wasting, or tenderness.


EYES:  Pink conjunctiva. No petechia or hemorrhage.   Pupils equal,  round and 

reactive to light.  Extraocular movements full and intact.   No scleral 

icterus. No injection or drainage. 


EARS, NOSE AND THROAT:   Nose without bleeding or purulent  nasal discharge.  

No sinus tenderness.  Mucous membranes pink and moist. No oral lesions noted. 

No exudate.  No oral thrush.


NECK: Trachea midline.  Supple and not tender, no meningeal signs 


CARDIOVASCULAR: Regular rate and rhythm.  No murmurs, rubs or gallops heard


RESPIRATORY:  Clear to auscultation. Breath sounds equal bilaterally.  No rales

, wheezing or rhonchi


ABDOMEN:  Soft, non-tender, nondistended.  Bowel sounds present and 

normoactive.  No guarding. No rebound.   No organomegaly. 


EXTREMITIES:   No clubbing, cyanosis, or edema.  R hand -  the thumb is swollen 

and has mild pink color,  has an incision on palmar aspect of his thumb, with 

packing, no purulence noted.  No lymphangitis seen going up his forearm/arm.   

No calf tenderness.  Well perfused and warm.  


NEUROLOGICAL: Awake and alert.  Cranial nerves grossly intact. Motor grossly 

within normal limits.


PSYCHIATRIC:  Normal affect,  calm and cooperative.


LINE:  No evidence of infection


Laboratory





Laboratory Tests








Test


  9/2/17


04:45


 


Creatinine 0.71 


 


Estimat Glomerular Filtration


Rate 117 


 


 


Vancomycin Level Trough 7.9 














 Date/Time


Source Procedure


Growth Status


 


 


 8/31/17 20:03


Wound Finger Fungal Smear - Final


NO FUNGAL ELEMENTS SEEN. Resulted


 


 8/31/17 20:03


Wound Finger Fungal Culture


Pending Resulted








Result Diagram:  


9/1/17 0436                                                                    

            9/2/17 0445





Imaging


RADIOLOGY STUDIES/FILMS REVIEWED














Finger X-Ray 8/31/17 1504 Signed





Impressions: 





 Service Date/Time:  Thursday, August 31, 2017 15:33 - CONCLUSION:  

Unremarkable 





 examination of the right first finger.  No radiopaque foreign body.     Lul Zacarias Jr., MD 











Assessment and Plan


Assessment and Plan


IMPRESSION


R thumb pyogenic flexor tenosynovitis S/P I and D,  C/S MRSA








RECOMMENDATION


Continue IV vancomycin


Monitor progress


Elevate RUE to help with swelling


Will D/W hand surgery





I will follow along with you


Thank you for this consultation











Ceci Mcelroy MD Sep 2, 2017 13:10

## 2017-09-02 NOTE — PD.ORT.PN
Subjective


Post Op Day #:  2


Pain Scale:  pain much improved


Subjective Remarks


pain much improved, but right thumb feels swollen, warm and a little decreased 

sensation





Objective


Vitals





Vital Signs








  Date Time  Temp Pulse Resp B/P (MAP) Pulse Ox O2 Delivery O2 Flow Rate FiO2


 


9/2/17 08:00 95.5 63 17 170/89 (116) 99   


 


9/2/17 07:55   16     


 


9/2/17 00:00 96.2 89 17 137/90 (106) 99   


 


9/1/17 20:00 96.3 81 17 152/86 (108) 100   


 


9/1/17 18:16        21


 


9/1/17 16:00 97.4 75 18 167/83 (111) 97   


 


9/1/17 12:00 96.9 84 17 151/70 (97) 95   














I/O      


 


 9/1/17 9/1/17 9/1/17 9/2/17 9/2/17 9/2/17





 07:00 15:00 23:00 07:00 15:00 23:00


 


Intake Total 1232 ml  1562.5 ml 647 ml 250 ml 


 


Output Total 1000 ml  400 ml   


 


Balance 232 ml  1162.5 ml 647 ml 250 ml 


 


      


 


Intake Oral 720 ml  900 ml   


 


IV Total 512 ml  662.5 ml 647 ml 250 ml 


 


Output Urine Total 1000 ml  400 ml   


 


# Voids   4   


 


# Bowel Movements   1   








Result Diagram:  


9/1/17 0436                                                                    

            9/2/17 0445





Other Results


wound culture grown MRSA and MICs noted


Objective Remarks


right thumb with moderate edema, but no erythema


Has reasonable flexion and extension of the right thumb, and wounds with 

serosanguinous drainage....no pus


wound otherwise clean.





Assessment & Plan


Ortho Post Op Day #:  2


Problem List:  


(1) pyogenic flexor tenosynovitis right thumb


Status:  Acute


Plan:  continue IV antibiotics--on Vancomycin and ID consult ordered


Wound cleaned with normal saline and repacked both areas with 1/4" plain gauze, 

sterile 4x4s, sahil


Encourage elevation and motion to help edema, drainage and motion.


will check wound again tomorrow and covering for Dr. HAHN





Assessment and Plan


see above











Cathy Diaz MD Sep 2, 2017 11:24

## 2017-09-03 VITALS
DIASTOLIC BLOOD PRESSURE: 81 MMHG | HEART RATE: 83 BPM | TEMPERATURE: 96.8 F | SYSTOLIC BLOOD PRESSURE: 160 MMHG | OXYGEN SATURATION: 96 % | RESPIRATION RATE: 17 BRPM

## 2017-09-03 VITALS
RESPIRATION RATE: 21 BRPM | SYSTOLIC BLOOD PRESSURE: 133 MMHG | OXYGEN SATURATION: 98 % | TEMPERATURE: 95.8 F | DIASTOLIC BLOOD PRESSURE: 89 MMHG | HEART RATE: 63 BPM

## 2017-09-03 VITALS
SYSTOLIC BLOOD PRESSURE: 165 MMHG | HEART RATE: 77 BPM | OXYGEN SATURATION: 97 % | DIASTOLIC BLOOD PRESSURE: 89 MMHG | TEMPERATURE: 96 F | RESPIRATION RATE: 18 BRPM

## 2017-09-03 VITALS
HEART RATE: 80 BPM | SYSTOLIC BLOOD PRESSURE: 107 MMHG | TEMPERATURE: 96.6 F | RESPIRATION RATE: 19 BRPM | DIASTOLIC BLOOD PRESSURE: 93 MMHG | OXYGEN SATURATION: 99 %

## 2017-09-03 VITALS
RESPIRATION RATE: 16 BRPM | TEMPERATURE: 96.9 F | DIASTOLIC BLOOD PRESSURE: 89 MMHG | SYSTOLIC BLOOD PRESSURE: 171 MMHG | OXYGEN SATURATION: 98 % | HEART RATE: 74 BPM

## 2017-09-03 VITALS — OXYGEN SATURATION: 99 %

## 2017-09-03 RX ADMIN — LISINOPRIL SCH MG: 10 TABLET ORAL at 08:32

## 2017-09-03 RX ADMIN — HYDROCODONE BITARTRATE AND ACETAMINOPHEN PRN TAB: 5; 325 TABLET ORAL at 08:38

## 2017-09-03 RX ADMIN — HYDROCODONE BITARTRATE AND ACETAMINOPHEN PRN TAB: 5; 325 TABLET ORAL at 01:27

## 2017-09-03 RX ADMIN — HYDROCODONE BITARTRATE AND ACETAMINOPHEN PRN TAB: 5; 325 TABLET ORAL at 16:07

## 2017-09-03 RX ADMIN — GABAPENTIN SCH MG: 300 CAPSULE ORAL at 12:37

## 2017-09-03 RX ADMIN — FOLIC ACID SCH MG: 1 TABLET ORAL at 08:32

## 2017-09-03 RX ADMIN — PHENYTOIN SODIUM SCH MLS/HR: 50 INJECTION INTRAMUSCULAR; INTRAVENOUS at 04:20

## 2017-09-03 RX ADMIN — PHENYTOIN SODIUM SCH MLS/HR: 50 INJECTION INTRAMUSCULAR; INTRAVENOUS at 12:39

## 2017-09-03 RX ADMIN — NICOTINE SCH PATCH: 21 PATCH, EXTENDED RELEASE TOPICAL at 08:33

## 2017-09-03 RX ADMIN — HYDROCODONE BITARTRATE AND ACETAMINOPHEN PRN TAB: 5; 325 TABLET ORAL at 05:01

## 2017-09-03 RX ADMIN — MULTIPLE VITAMINS W/ MINERALS TAB SCH TAB: TAB at 08:32

## 2017-09-03 RX ADMIN — Medication SCH ML: at 08:29

## 2017-09-03 RX ADMIN — STANDARDIZED SENNA CONCENTRATE AND DOCUSATE SODIUM SCH TAB: 8.6; 5 TABLET, FILM COATED ORAL at 20:45

## 2017-09-03 RX ADMIN — STANDARDIZED SENNA CONCENTRATE AND DOCUSATE SODIUM SCH TAB: 8.6; 5 TABLET, FILM COATED ORAL at 08:32

## 2017-09-03 RX ADMIN — SODIUM CHLORIDE SCH MLS/HR: 900 INJECTION INTRAVENOUS at 04:20

## 2017-09-03 RX ADMIN — SODIUM CHLORIDE SCH MLS/HR: 900 INJECTION INTRAVENOUS at 16:06

## 2017-09-03 RX ADMIN — Medication SCH MG: at 08:32

## 2017-09-03 RX ADMIN — Medication SCH ML: at 20:45

## 2017-09-03 RX ADMIN — GABAPENTIN SCH MG: 300 CAPSULE ORAL at 08:31

## 2017-09-03 RX ADMIN — HYDROCODONE BITARTRATE AND ACETAMINOPHEN PRN TAB: 5; 325 TABLET ORAL at 12:37

## 2017-09-03 RX ADMIN — GABAPENTIN SCH MG: 300 CAPSULE ORAL at 17:16

## 2017-09-03 RX ADMIN — HYDROCODONE BITARTRATE AND ACETAMINOPHEN PRN TAB: 5; 325 TABLET ORAL at 20:45

## 2017-09-03 NOTE — MP
cc:

GREGG CAMACHO MD

****

 

 

DATE OF SURGERY: 8/31/2017

 

PREOPERATIVE DIAGNOSIS:

Right thumb infection.

 

POSTOPERATIVE DIAGNOSIS:

Pyogenic flexor tenosynovitis right thumb.

 

PROCEDURE

Incision and drainage right thumb and drainage of flexor tendon sheath right

thumb.

 

SURGEON

Dr. Camacho

 

ANESTHESIA

General

 

ESTIMATED BLOOD LOSS

Minimal

 

TOURNIQUET TIME

21 minutes at 250 mmHg.

 

SPECIMEN:

Sent for culture and sensitivity.  One specimen was from the thumb pulp, the

second specimen was from the flexor tendon sheath, right thumb.

 

DISPOSITION:

The patient was recovered and sent to the Recovery Room in stable condition.

 

INDICATIONS FOR PROCEDURE:

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

to the ED with complaints of pain involving the right thumb for the past 3

days.  The patient was working on a wooden deck and noticed pain the following

day.  He complains of pain, swelling involving the right thumb.  The pain is

worse with range of motion of the thumb.  Also he complained of numbness over

the pulp region.

 

On examination he had a small puncture wound with drainage of the volar aspect

of the IP joint of the thumb.  There was evidence of swelling and redness along

the flexor tendon sheath.  He had tenderness along the flexor tendon sheath but

range of motion of the thumb was limited and painful.  Decreased sensation

noted over the pulp.

 

X-rays were negative for radiopaque foreign body.  The patient was consented

for incision and drainage, possible drainage of flexor tendon sheath right

thumb.

 

The patient was explained the risk and benefits of the procedure.

 

DESCRIPTION OF PROCEDURE:

The patient was brought to the operating room and under general anesthesia the

right upper extremity was thoroughly prepped and draped.  Incision site was

marked in an oblique zig-zag fashion corresponding to the IP joint of the thumb

incorporating the puncture wound.  Another incision site was marked at the

volar aspect of the A1 pulley region of the thumb in a zig-zag fashion. After

limb elevation, the tourniquet was inflated 250 mmHg.

 

Attention was initially directed to the thumb pulp region.  Incision was made

over the IP joint region.  Soft tissue dissection was carried out.  On

exploration he had a pocket of pus in the pulp region.  There was also evidence

of bulging of the flexor tendon sheath in that region.  The flexor tendon

sheath was opened.  There was evidence of the purulent material within the

flexor tendon sheath of the IP joint region.

 

The material was sent for culture and sensitivity from the pulp region and from

the flexor tendon sheath.  Excisional debridement of necrotic tissue within the

pulp region was carried out.  No other pockets of pus were found.  Because of

involvement of the flexor tendon sheath decision was made to proceed with

drainage proximally.  Incision was made over the A1 pulley region in a zigzag

fashion.  Skin flaps were elevated exposing the A1 pulley.

 

The A1 pulley was released in a proximal to distal direction.  No evidence of

purulence was noted in this region.  Thorough wash was given using normal

saline mixed with  irrigant in a  proximal to distal direction using a

vascular cannula.  This was carried out until the effluent at the distal aspect

was clear.  Thorough wash was given at the pulp region using normal saline

mixed with  irrigant and hydrogen peroxide.  Tourniquet is deflated.

Tourniquet time was 21 minutes.  Bleeding points were cauterized with bipolar

cautery.  Packing of the wounds were carried out of the pulp region and in the

A1 pulley region with 1/4 inch iodoform packing.  The rest of the skin incision

was approximated using 5-0 nylon in a horizontal mattress interrupted

fashion.  He had good distal circulation at the end of the procedure.  Finger

dressing was applied which was held in place by Sof-Rol and a Taylor.  The

patient was recovered and sent to the Recovery Room in stable condition.

 

The plan will be to continue with IV antibiotics and continue the packing

tomorrow.

 

 

 

                              _________________________________

                              Gregg Camacho MD [facility]

D:  8/31/2017/8:06 PM

T:  9/3/2017/8:20 AM

Visit #:  [id number]

Job #:  [id number]

[place]

## 2017-09-03 NOTE — PD.ORT.PN
Subjective


Post Op Day #:  3


Pain Scale:  8/10 right now


Subjective Remarks


pain improved, but right thumb feels swollen





Objective


Vitals





Vital Signs








  Date Time  Temp Pulse Resp B/P (MAP) Pulse Ox O2 Delivery O2 Flow Rate FiO2


 


9/3/17 12:00 96.8 83 17 160/81 (107) 96   


 


9/3/17 08:00 96.9 74 16 171/89 (116) 98   


 


9/3/17 00:00 95.8 63 21 133/89 (104) 98   


 


9/2/17 20:00 97.6 63 21 162/74 (103) 98   


 


9/2/17 17:50     98   21


 


9/2/17 16:00 97.4 89 17 164/91 (115) 99   














I/O      


 


 9/2/17 9/2/17 9/2/17 9/3/17 9/3/17 9/3/17





 07:00 15:00 23:00 07:00 15:00 23:00


 


Intake Total 647 ml 1011 ml 1290 ml 240 ml  


 


Output Total   3620 ml 650 ml  


 


Balance 647 ml 1011 ml -2330 ml -410 ml  


 


      


 


Intake Oral   1290 ml 240 ml  


 


IV Total 647 ml 1011 ml    


 


Output Urine Total   3620 ml 650 ml  


 


# Voids   1   


 


# Bowel Movements   1   








Result Diagram:  


9/1/17 0436                                                                    

            9/2/17 0445





Other Results


culture MRSA and MICs noted--sensitive to chloramphenicol, daptomycin, linezolid

, rifampin, tetracycline, Bactrim and vancomycin


Objective Remarks


Despite saying that pain is 8/10, was sleeping when I arrived today to change 

the dressing


right thumb with moderate edema, but no erythema; Edema has improved as well


Has much improved flexion and extension of the right thumb, and wounds with 

serosanguinous drainage....no pus


wound otherwise clean.


no expressible drainage





Assessment & Plan


Ortho Post Op Day #:  3


Problem List:  


(1) pyogenic flexor tenosynovitis right thumb


Status:  Acute


Plan:  continue IV antibiotics--on Vancomycin and Dr Mcelroy to give recs--

discussed with her that the thumb has improved.


Wound cleaned with normal saline and repacked proximal area with 1/4" plain 

gauze, sterile 4x4s, sahli


Encourage elevation and motion to help edema, drainage and motion.


will check wound again tomorrow and probably will not repack and may be able to 

be discharged soon.


Covering for Dr. HAHN





Assessment and Plan


see above











Cathy Diaz MD Sep 3, 2017 13:07

## 2017-09-04 VITALS
OXYGEN SATURATION: 98 % | DIASTOLIC BLOOD PRESSURE: 83 MMHG | RESPIRATION RATE: 17 BRPM | HEART RATE: 83 BPM | TEMPERATURE: 97.4 F | SYSTOLIC BLOOD PRESSURE: 145 MMHG

## 2017-09-04 VITALS
SYSTOLIC BLOOD PRESSURE: 177 MMHG | OXYGEN SATURATION: 99 % | DIASTOLIC BLOOD PRESSURE: 96 MMHG | TEMPERATURE: 96.5 F | RESPIRATION RATE: 16 BRPM | HEART RATE: 80 BPM

## 2017-09-04 VITALS
RESPIRATION RATE: 18 BRPM | TEMPERATURE: 96.6 F | HEART RATE: 70 BPM | DIASTOLIC BLOOD PRESSURE: 88 MMHG | SYSTOLIC BLOOD PRESSURE: 148 MMHG | OXYGEN SATURATION: 97 %

## 2017-09-04 VITALS
DIASTOLIC BLOOD PRESSURE: 87 MMHG | TEMPERATURE: 96.8 F | RESPIRATION RATE: 14 BRPM | HEART RATE: 65 BPM | OXYGEN SATURATION: 99 % | SYSTOLIC BLOOD PRESSURE: 178 MMHG

## 2017-09-04 VITALS — OXYGEN SATURATION: 99 %

## 2017-09-04 LAB
ANION GAP SERPL CALC-SCNC: 6 MEQ/L (ref 5–15)
BUN SERPL-MCNC: 7 MG/DL (ref 7–18)
CHLORIDE SERPL-SCNC: 109 MEQ/L (ref 98–107)
GFR SERPLBLD BASED ON 1.73 SQ M-ARVRAT: 130 ML/MIN (ref 89–?)
HCO3 BLD-SCNC: 26.8 MEQ/L (ref 21–32)
POTASSIUM SERPL-SCNC: 3.8 MEQ/L (ref 3.5–5.1)
SODIUM SERPL-SCNC: 142 MEQ/L (ref 136–145)
VANCOMYCIN TROUGH SERPL-MCNC: 15.2 MCG/ML (ref 5–10)

## 2017-09-04 RX ADMIN — STANDARDIZED SENNA CONCENTRATE AND DOCUSATE SODIUM SCH TAB: 8.6; 5 TABLET, FILM COATED ORAL at 08:24

## 2017-09-04 RX ADMIN — HYDROCODONE BITARTRATE AND ACETAMINOPHEN PRN TAB: 5; 325 TABLET ORAL at 01:14

## 2017-09-04 RX ADMIN — GABAPENTIN SCH MG: 300 CAPSULE ORAL at 12:46

## 2017-09-04 RX ADMIN — SODIUM CHLORIDE SCH MLS/HR: 900 INJECTION INTRAVENOUS at 16:22

## 2017-09-04 RX ADMIN — LISINOPRIL SCH MG: 10 TABLET ORAL at 08:24

## 2017-09-04 RX ADMIN — HYDROCODONE BITARTRATE AND ACETAMINOPHEN PRN TAB: 5; 325 TABLET ORAL at 12:46

## 2017-09-04 RX ADMIN — MULTIPLE VITAMINS W/ MINERALS TAB SCH TAB: TAB at 08:23

## 2017-09-04 RX ADMIN — PHENYTOIN SODIUM SCH MLS/HR: 50 INJECTION INTRAMUSCULAR; INTRAVENOUS at 16:23

## 2017-09-04 RX ADMIN — PHENYTOIN SODIUM SCH MLS/HR: 50 INJECTION INTRAMUSCULAR; INTRAVENOUS at 12:00

## 2017-09-04 RX ADMIN — Medication SCH MG: at 08:23

## 2017-09-04 RX ADMIN — PHENYTOIN SODIUM SCH MLS/HR: 50 INJECTION INTRAMUSCULAR; INTRAVENOUS at 01:14

## 2017-09-04 RX ADMIN — NICOTINE SCH PATCH: 21 PATCH, EXTENDED RELEASE TOPICAL at 08:24

## 2017-09-04 RX ADMIN — FOLIC ACID SCH MG: 1 TABLET ORAL at 08:24

## 2017-09-04 RX ADMIN — SODIUM CHLORIDE SCH MLS/HR: 900 INJECTION INTRAVENOUS at 05:40

## 2017-09-04 RX ADMIN — Medication SCH ML: at 08:24

## 2017-09-04 RX ADMIN — HYDROCODONE BITARTRATE AND ACETAMINOPHEN PRN TAB: 5; 325 TABLET ORAL at 16:22

## 2017-09-04 RX ADMIN — GABAPENTIN SCH MG: 300 CAPSULE ORAL at 08:24

## 2017-09-04 RX ADMIN — HYDROCODONE BITARTRATE AND ACETAMINOPHEN PRN TAB: 5; 325 TABLET ORAL at 08:24

## 2017-09-04 NOTE — HHI.DS
__________________________________________________





Discharge Summary


Admission Date


Aug 31, 2017 at 17:04


Discharge Date:  Sep 4, 2017


Admitting Diagnosis





Flexor Tenosynovitis





(1) right thumb infection


Procedures


I&D of the right thumb and flexor tendon sheath drainage POD 4


Brief History - From Admission


52-year-old male with a past medical history of HTN who presented with right 

thumb pain and swelling.  Patient states that for the past 2 days he has been 

having worsening pain and swelling of his right thumb.  He doesn't recall any 

injury.  He does work as a kohli.  The pain is worse with movement.  He had 

low-grade fevers in the ED, but denies any other fevers or chills.  The pain 

got significantly worse today so he came to the ED for evaluation.  Hand 

surgery was contacted and planning on operative intervention today.  The 

patient denies any other complaints or concerns.  He denies any chest pain, 

shortness of breath, nausea, vomiting.  He denies any problems with alcohol 

withdrawal.


CBC/BMP:  


9/1/17 0436                                                                    

            9/4/17 0326





Significant Findings





Laboratory Tests








Test


  9/2/17


04:45 9/4/17


03:26


 


Calcium Level


  


  8.4 MG/DL


(8.5-10.1)


 


Chloride Level


  


  109 MEQ/L


()


 


Vancomycin Level Trough


  


  15.2 MCG/ML


(5.0-10.0)








Imaging





Last Impressions








Finger X-Ray 8/31/17 1504 Signed





Impressions: 





 Service Date/Time:  Thursday, August 31, 2017 15:33 - CONCLUSION:  

Unremarkable 





 examination of the right first finger.  No radiopaque foreign body.     Lul Zacarias Jr., MD 








PE at Discharge


GENERAL: Well-developed well-nourished. 


SKIN:  Right hand w dressing in place.  


HEENT: EOMI, trachea midline


CARDIOVASCULAR: Regular rate and rhythm.  No murmur appreciated.


RESPIRATORY: No accessory muscle use. Clear to auscultation. 


GASTROINTESTINAL: Abdomen soft, non-tender, nondistended. Bowel sounds x4.


MUSCULOSKELETAL:no edema noted in lower extremities, moves all extremities.


NEUROLOGICAL: Awake and alert. No focal neurological deficits. Normal speech.


PSYCHIATRIC: Appropriate mood and affect; insight and judgment normal.


Hospital Course


52-year-old male with a past medical history of HTN who presented with right 

thumb pain and swelling





Right thumb cellulitis: s/p I&D of the right thumb and flexor tendon sheath 

drainage POD 4


-wound cx growing MRSA. s/p Vanc, ID recommends po zyvox. 


-Hand sx managing pt and has cleared him for discharge.


-continue pain control.


-Elevate arm per Hand sx recs





Hypertension: Chronic,on lisinopril  20mg po daily, amlodipine 5mg po daily.  





Tobacco abuse: Cessation counseling.  Nicotine patch.


Pt Condition on Discharge:  Stable


Discharge Disposition:  Discharge Home


Discharge Time:  > 30 minutes


Discharge Instructions


Follow up Referrals:  


Hand Surgery - 2 Days @ [facility] with Gregg Quesada MD


PCP Follow-up - 2-3 Days





New Medications:  


Hydrocodone-Acetaminophen (Norco) 7.5-325 mg Tab


1 TAB PO Q6H PRN for PAIN, #30 TAB 0 Refills





Linezolid (Zyvox) 600 Mg Tab


600 MG PO Q12H for Infection for 10 Days, TAB 0 Refills





Amlodipine (Norvasc) 5 Mg Tab


5 MG PO DAILY for 30 Days, TAB





Lisinopril (Lisinopril) 10 Mg Tab


20 MG PO DAILY for 30 Days, TAB





 


Continued Medications:  


Fluoxetine (Prozac) 40 Mg Cap


40 MG PO DAILY, #30 CAP 0 Refills





Gabapentin (Gabapentin) 300 Mg Cap


300 MG PO TID, #90 CAP 0 Refills





Hydroxyzine Pamoate (Vistaril) 25 Mg Cap


25 MG PO TID PRN for MODERATE TO SEVERE ANXIETY, CAP 0 Refills





Trazodone (Trazodone) 100 Mg Tablet


100 MG PO HS for Control Depression, #30 TAB 0 Refills





 


Discontinued Medications:  


Lisinopril (Lisinopril) 10 Mg Tab


10 MG PO DAILY, #30 TAB 0 Refills

















Buffy Langford MD Sep 4, 2017 17:13

## 2017-09-04 NOTE — HHI.PR
Subjective


Remarks


Pt states that pain is present, apparently RN didn't give him his 5am dose of 

his pain meds and he was very frustrated about it. 


He has been trying to elevate his arm, denies any CP/SOB/N/V





Objective


Vitals





Vital Signs








  Date Time  Temp Pulse Resp B/P (MAP) Pulse Ox O2 Delivery O2 Flow Rate FiO2


 


9/4/17 10:42     99   


 


9/4/17 08:00 96.8 65 14 178/87 (117) 99   


 


9/4/17 00:00 96.6 70 18 148/88 (108) 97   


 


9/3/17 20:00 96.0 77 18 165/89 (114) 97   


 


9/3/17 18:00     99   21


 


9/3/17 17:10   16     


 


9/3/17 16:00 98.1 83 19 177/93 (121) 99   


 


9/3/17 13:46        21


 


9/3/17 12:00 96.8 83 17 160/81 (107) 96   














I/O      


 


 9/3/17 9/3/17 9/3/17 9/4/17 9/4/17 9/4/17





 06:59 14:59 22:59 06:59 14:59 22:59


 


Intake Total 240 ml  845 ml 480 ml  


 


Output Total 650 ml  750 ml 1000 ml  


 


Balance -410 ml  95 ml -520 ml  


 


      


 


Intake Oral 240 ml  845 ml 480 ml  


 


Output Urine Total 650 ml  750 ml 1000 ml  


 


# Bowel Movements   0 0  








Result Diagram:  


9/1/17 0436                                                                    

            9/4/17 0326





Imaging





Last Impressions








Finger X-Ray 8/31/17 1504 Signed





Impressions: 





 Service Date/Time:  Thursday, August 31, 2017 15:33 - CONCLUSION:  

Unremarkable 





 examination of the right first finger.  No radiopaque foreign body.     Lul Zacarias Jr., MD 








Objective Remarks


GENERAL: Well-developed well-nourished. 


SKIN:  Right hand w dressing in place.  


HEENT: EOMI, trachea midline


CARDIOVASCULAR: Regular rate and rhythm.  No murmur appreciated.


RESPIRATORY: No accessory muscle use. Clear to auscultation. 


GASTROINTESTINAL: Abdomen soft, non-tender, nondistended. Bowel sounds x4.


MUSCULOSKELETAL:no edema noted in lower extremities, moves all extremities.


NEUROLOGICAL: Awake and alert. No focal neurological deficits. Normal speech.


PSYCHIATRIC: Appropriate mood and affect; insight and judgment normal.





A/P


Assessment and Plan


52-year-old male with a past medical history of HTN who presented with right 

thumb pain and swelling





Right thumb cellulitis: s/p I&D of the right thumb and flexor tendon sheath 

drainage POD 4


-wound cx growing MRSA. ID following and recommends continuing the vanco. 

monitor creatinine 


-Management per hand sx.


-Pain control with oral and intravenous narcotics as needed


-Elevate arm per Hand sx recs





Alcohol abuse:/Withdrawals.  Thiamine, folate, multivitamins. CIWA protocol.





Hypertension: Chronic, stable.   lisinopril increased to 20mg po daily, added 

amlodipine 5mg po daily.  Clonidine as needed.





Tobacco abuse: Cessation counseling.  Nicotine patch.





DVT prophylaxis: SCDs


Discharge Planning


awaiting clearance from consultants.


continue pain control











Buffy Langford MD Sep 4, 2017 11:40

## 2017-09-04 NOTE — HHI.IDPN
Subjective


Subjective


Remarks


Patient is a 52-year-old male, presented to the hospital complaining of 2 day 

history of worsening pain and swelling of his right thumb.  Patient was 

apparently working on a deck the day prior to onset of his symptoms.  He really 

did not recall any injury, but he was concerned that he might have had some 

kind of a splinter.  2 days prior to admission he noted swelling and pain on 

his right thumb, and it progressively worsened that he is not really been able 

to move well his right hand.  He's had some subjective fevers.  Denies any 

other symptoms as far as respiratory, GI or any urinary complaints.  Patient 

has been admitted, and he has not been febrile.  WBC is normal.  Hand surgery 

saw the patient, and did I&D of his right thumb.  Culture is growing MRSA.  

Imaging study did not reveal any foreign body.


Infectious disease consultation has been requested to evaluate the patient.





Notes reviewed


Temps ok


C/O pain in his R thumb, pain meds not enough


No rash or itching


No diarrhea


No other complaints


Antibiotics


Vancomycin


Lines


PIV


Past Medical History


Hypertension


Past Surgical History


Left eye surgery


Ankle surgery with hardware placement


Allergies:  


Coded Allergies:  


     No Known Allergies (Verified , 8/31/17)





Objective


.





Vital Signs








  Date Time  Temp Pulse Resp B/P (MAP) Pulse Ox O2 Delivery O2 Flow Rate FiO2


 


9/4/17 10:42     99   


 


9/4/17 08:00 96.8 65 14 178/87 (117) 99   


 


9/4/17 00:00 96.6 70 18 148/88 (108) 97   


 


9/3/17 20:00 96.0 77 18 165/89 (114) 97   


 


9/3/17 18:00     99   21


 


9/3/17 17:10   16     


 


9/3/17 16:00 98.1 83 19 177/93 (121) 99   


 


9/3/17 13:46        21


 


9/3/17 12:00 96.8 83 17 160/81 (107) 96   








.





Laboratory Tests








Test


  9/4/17


03:26


 


Blood Urea Nitrogen 7 MG/DL 


 


Creatinine 0.65 MG/DL 


 


Random Glucose 86 MG/DL 


 


Calcium Level 8.4 MG/DL 


 


Sodium Level 142 MEQ/L 


 


Potassium Level 3.8 MEQ/L 


 


Chloride Level 109 MEQ/L 


 


Carbon Dioxide Level 26.8 MEQ/L 


 


Anion Gap 6 MEQ/L 


 


Estimat Glomerular Filtration


Rate 130 ML/MIN 


 








Imaging














Finger X-Ray 8/31/17 1504 Signed





Impressions: 





 Service Date/Time:  Thursday, August 31, 2017 15:33 - CONCLUSION:  

Unremarkable 





 examination of the right first finger.  No radiopaque foreign body.     Lul Zacarias Jr., MD 








Physical Exam


GENERAL:      awake and alert,   not in respiratory distress.


SKIN: Warm and dry.  No generalized rash.


EYES:  Pink conjunctiva. No petechia or hemorrhage.   Pupils equal,  round and 

reactive to light.  No scleral icterus. No injection or drainage. 


EARS, NOSE AND THROAT:   Nose without bleeding or purulent  nasal discharge.  

No sinus tenderness.  Mucous membranes pink and moist. No oral lesions noted. 


NECK: Trachea midline.  Supple and not tender, no meningeal signs 


CARDIOVASCULAR: Regular rate and rhythm.  No murmurs, rubs or gallops heard


RESPIRATORY:  Clear to auscultation. Breath sounds equal bilaterally.  No rales

, wheezing or rhonchi


ABDOMEN:  Soft, non-tender, nondistended.  Bowel sounds present and 

normoactive.  No guarding. No rebound.   No organomegaly. 


EXTREMITIES:   No clubbing, cyanosis, or edema.  R hand -  the thumb is swollen 

and has mild pink color,  has an incision on palmar aspect of his thumb, with 

packing, no purulence noted.  No lymphangitis seen going up his forearm/arm.   

No calf tenderness.  Well perfused and warm.  


NEUROLOGICAL: Non-focal


PSYCHIATRIC:  Normal affect,  calm and cooperative.


LINE:  No evidence of infection





Assessment & Plan


Remarks


IMPRESSION


R thumb pyogenic flexor tenosynovitis S/P I and D,  C/S MRSA


   -  better








RECOMMENDATION


Continue IV vancomycin


Zyvox on D/C


   -  I spoke with CM to assist with getting Zyvox for patient when he gets D/C


Monitor progress


Elevate RUE to help with swelling





Explained plan to  patient











Ceci Mcelroy MD Sep 4, 2017 10:48

## 2017-09-04 NOTE — PD.ORT.PN
Subjective


Post Op Day #:  4


Pain Scale:  says having more pain, but sleeping when I came in the room


Subjective Remarks


pain improved, but right thumb feels swollen--moving better





Objective


Vitals





Vital Signs








  Date Time  Temp Pulse Resp B/P (MAP) Pulse Ox O2 Delivery O2 Flow Rate FiO2


 


9/4/17 10:42     99   


 


9/4/17 08:00 96.8 65 14 178/87 (117) 99   


 


9/4/17 00:00 96.6 70 18 148/88 (108) 97   


 


9/3/17 20:00 96.0 77 18 165/89 (114) 97   


 


9/3/17 18:00     99   21


 


9/3/17 17:10   16     


 


9/3/17 16:00 98.1 83 19 177/93 (121) 99   


 


9/3/17 13:46        21


 


9/3/17 12:00 96.8 83 17 160/81 (107) 96   














I/O      


 


 9/3/17 9/3/17 9/3/17 9/4/17 9/4/17 9/4/17





 07:00 15:00 23:00 07:00 15:00 23:00


 


Intake Total 240 ml  845 ml 480 ml  


 


Output Total 650 ml  750 ml 1000 ml  


 


Balance -410 ml  95 ml -520 ml  


 


      


 


Intake Oral 240 ml  845 ml 480 ml  


 


Output Urine Total 650 ml  750 ml 1000 ml  


 


# Bowel Movements   0 0  








Result Diagram:  


9/1/17 0436                                                                    

            9/4/17 0326





Objective Remarks


sleeping when I arrived today to change the dressing


 right thumb with moderate edema, but no erythema; Edema has improved again and 

dressing was disheveled


Has much improved flexion and extension of the right thumb, and wounds with no 

expressible drainage





Assessment & Plan


Ortho Post Op Day #:  4


Problem List:  


(1) pyogenic flexor tenosynovitis right thumb


Status:  Acute


Plan:  Dr. Lilibeth abraham noted


Wound cleaned with normal saline and redressed with Bactroban ointment, sterile 

2x2s, sahil right thumb


Encourage elevation and motion to help edema, drainage and motion.


Could be discharged on PO Zyvox if available at pharmacy--has coupon and case 

manager will call to see if in stock


Should see Dr. Quesada on Wed, 9/6--should call the office tomorrow am to 

make that appt.





Assessment and Plan


see above











Cathy Diaz MD Sep 4, 2017 11:59

## 2017-10-26 ENCOUNTER — HOSPITAL ENCOUNTER (INPATIENT)
Dept: HOSPITAL 17 - NEPK | Age: 52
LOS: 4 days | Discharge: HOME | DRG: 504 | End: 2017-10-30
Attending: HOSPITALIST | Admitting: HOSPITALIST
Payer: COMMERCIAL

## 2017-10-26 VITALS — WEIGHT: 199.08 LBS | BODY MASS INDEX: 30.17 KG/M2 | HEIGHT: 68 IN

## 2017-10-26 VITALS
SYSTOLIC BLOOD PRESSURE: 113 MMHG | RESPIRATION RATE: 14 BRPM | HEART RATE: 83 BPM | OXYGEN SATURATION: 98 % | DIASTOLIC BLOOD PRESSURE: 65 MMHG

## 2017-10-26 VITALS
RESPIRATION RATE: 20 BRPM | TEMPERATURE: 99 F | OXYGEN SATURATION: 99 % | HEART RATE: 112 BPM | DIASTOLIC BLOOD PRESSURE: 74 MMHG | SYSTOLIC BLOOD PRESSURE: 122 MMHG

## 2017-10-26 VITALS
RESPIRATION RATE: 18 BRPM | TEMPERATURE: 98.3 F | DIASTOLIC BLOOD PRESSURE: 86 MMHG | SYSTOLIC BLOOD PRESSURE: 143 MMHG | OXYGEN SATURATION: 100 % | HEART RATE: 80 BPM

## 2017-10-26 DIAGNOSIS — F12.10: ICD-10-CM

## 2017-10-26 DIAGNOSIS — E87.2: ICD-10-CM

## 2017-10-26 DIAGNOSIS — E86.0: ICD-10-CM

## 2017-10-26 DIAGNOSIS — I10: ICD-10-CM

## 2017-10-26 DIAGNOSIS — M86.171: Primary | ICD-10-CM

## 2017-10-26 DIAGNOSIS — E66.3: ICD-10-CM

## 2017-10-26 DIAGNOSIS — F41.9: ICD-10-CM

## 2017-10-26 DIAGNOSIS — F17.210: ICD-10-CM

## 2017-10-26 DIAGNOSIS — M86.671: ICD-10-CM

## 2017-10-26 LAB
ANION GAP SERPL CALC-SCNC: 8 MEQ/L (ref 5–15)
APTT BLD: 27.6 SEC (ref 24.3–30.1)
BASOPHILS # BLD AUTO: 0 TH/MM3 (ref 0–0.2)
BASOPHILS NFR BLD: 0.4 % (ref 0–2)
BUN SERPL-MCNC: 7 MG/DL (ref 7–18)
CHLORIDE SERPL-SCNC: 103 MEQ/L (ref 98–107)
EOSINOPHIL # BLD: 0 TH/MM3 (ref 0–0.4)
EOSINOPHIL NFR BLD: 0.4 % (ref 0–4)
ERYTHROCYTE [DISTWIDTH] IN BLOOD BY AUTOMATED COUNT: 12.6 % (ref 11.6–17.2)
GFR SERPLBLD BASED ON 1.73 SQ M-ARVRAT: 71 ML/MIN (ref 89–?)
HCO3 BLD-SCNC: 24.7 MEQ/L (ref 21–32)
HCT VFR BLD CALC: 44.6 % (ref 39–51)
HEMO FLAGS: (no result)
INR PPP: 1 RATIO
LYMPHOCYTES # BLD AUTO: 2.3 TH/MM3 (ref 1–4.8)
LYMPHOCYTES NFR BLD AUTO: 24.7 % (ref 9–44)
MCH RBC QN AUTO: 32.2 PG (ref 27–34)
MCHC RBC AUTO-ENTMCNC: 34.2 % (ref 32–36)
MCV RBC AUTO: 94.3 FL (ref 80–100)
MONOCYTES NFR BLD: 7.8 % (ref 0–8)
NEUTROPHILS # BLD AUTO: 6.2 TH/MM3 (ref 1.8–7.7)
NEUTROPHILS NFR BLD AUTO: 66.7 % (ref 16–70)
PLATELET # BLD: 362 TH/MM3 (ref 150–450)
POTASSIUM SERPL-SCNC: 4 MEQ/L (ref 3.5–5.1)
PROTHROMBIN TIME: 10.5 SEC (ref 9.8–11.6)
RBC # BLD AUTO: 4.72 MIL/MM3 (ref 4.5–5.9)
SODIUM SERPL-SCNC: 136 MEQ/L (ref 136–145)
WBC # BLD AUTO: 9.3 TH/MM3 (ref 4–11)

## 2017-10-26 PROCEDURE — 88311 DECALCIFY TISSUE: CPT

## 2017-10-26 PROCEDURE — 87185 SC STD ENZYME DETCJ PER NZM: CPT

## 2017-10-26 PROCEDURE — 87186 SC STD MICRODIL/AGAR DIL: CPT

## 2017-10-26 PROCEDURE — 88307 TISSUE EXAM BY PATHOLOGIST: CPT

## 2017-10-26 PROCEDURE — 88305 TISSUE EXAM BY PATHOLOGIST: CPT

## 2017-10-26 PROCEDURE — 84550 ASSAY OF BLOOD/URIC ACID: CPT

## 2017-10-26 PROCEDURE — 80202 ASSAY OF VANCOMYCIN: CPT

## 2017-10-26 PROCEDURE — 83605 ASSAY OF LACTIC ACID: CPT

## 2017-10-26 PROCEDURE — 82565 ASSAY OF CREATININE: CPT

## 2017-10-26 PROCEDURE — 86403 PARTICLE AGGLUT ANTBDY SCRN: CPT

## 2017-10-26 PROCEDURE — 85027 COMPLETE CBC AUTOMATED: CPT

## 2017-10-26 PROCEDURE — 71020: CPT

## 2017-10-26 PROCEDURE — L3260 AMBULATORY SURGICAL BOOT EAC: HCPCS

## 2017-10-26 PROCEDURE — 80053 COMPREHEN METABOLIC PANEL: CPT

## 2017-10-26 PROCEDURE — 85730 THROMBOPLASTIN TIME PARTIAL: CPT

## 2017-10-26 PROCEDURE — 87205 SMEAR GRAM STAIN: CPT

## 2017-10-26 PROCEDURE — 87070 CULTURE OTHR SPECIMN AEROBIC: CPT

## 2017-10-26 PROCEDURE — 80048 BASIC METABOLIC PNL TOTAL CA: CPT

## 2017-10-26 PROCEDURE — 82948 REAGENT STRIP/BLOOD GLUCOSE: CPT

## 2017-10-26 PROCEDURE — 85610 PROTHROMBIN TIME: CPT

## 2017-10-26 PROCEDURE — 85025 COMPLETE CBC W/AUTO DIFF WBC: CPT

## 2017-10-26 PROCEDURE — 73630 X-RAY EXAM OF FOOT: CPT

## 2017-10-26 RX ADMIN — STANDARDIZED SENNA CONCENTRATE AND DOCUSATE SODIUM SCH TAB: 8.6; 5 TABLET, FILM COATED ORAL at 21:59

## 2017-10-26 RX ADMIN — HYDROCODONE BITARTRATE AND ACETAMINOPHEN PRN TAB: 5; 325 TABLET ORAL at 21:59

## 2017-10-26 RX ADMIN — PHENYTOIN SODIUM SCH MLS/HR: 50 INJECTION INTRAMUSCULAR; INTRAVENOUS at 20:39

## 2017-10-26 RX ADMIN — Medication SCH ML: at 21:00

## 2017-10-26 NOTE — PD
HPI


Chief Complaint:  Skin Problem


Time Seen by Provider:  17:31


Travel History


International Travel<30 days:  No


Contact w/Intl Traveler<30days:  No


Traveled to known affect area:  No





History of Present Illness


HPI


52-year-old male presents to the emergency Department with complaint of right 

foot second toe swelling and redness for a couple weeks with worsening today.  

Reports paresthesias.  Denies loss of sensation.  Reports decreased range of 

motion.  Reports pain on flexion and extension of the toe.  Reports a wound to 

the inner medial toe that has been there for about 1-2 weeks.  Denies fever, 

vomiting.  Rates pain 10/10.  Describes pain as hot and burning.  He has tried 

soaking in Epsom salt and using an antifungal cream.  Pain is constant.  No 

known relieving factors.  Up-to-date on tetanus vaccination.  Says he was 

admitted to the hospital a couple months ago for an area on his hand with 

similar symptoms that needed surgery; said he thought it was a splinter but he 

ended up in surgery for.  No known allergies.  Does not have an established 

primary care provider.  History of hypertension.  Has no other medical 

complaints.  No other modifying factors or associated signs and symptoms.





PFSH


Past Medical History


Hx Anticoagulant Therapy:  No


Blood Disorders:  No


Anxiety:  Yes


Cancer:  No


Cardiovascular Problems:  Yes (HTN)


Chemotherapy:  No


Cerebrovascular Accident:  No


Diabetes:  No


Diminished Hearing:  No


Endocrine:  No


Gastrointestinal Disorders:  No


Genitourinary:  No


Hypertension:  Yes


Immune Disorder:  No


Implanted Vascular Access Dvce:  No


Musculoskeletal:  No


Neurologic:  No


Psychiatric:  No


Reproductive:  No


Respiratory:  No





Past Surgical History


Eye Surgery:  Yes (LEFT EYE TEAR DUCT)


Other Surgery:  Yes





Social History


Alcohol Use:  Yes (FEW BEERS DAILY)


Tobacco Use:  Yes (1/4 PPD)


Substance Use:  Yes (MARAJUANA )





Allergies-Medications


(Allergen,Severity, Reaction):  


Coded Allergies:  


     No Known Allergies (Verified , 10/26/17)


Reported Meds & Prescriptions





Reported Meds & Active Scripts


Active


Lisinopril 10 Mg Tab 20 Mg PO DAILY 30 Days


Norvasc (Amlodipine Besylate) 5 Mg Tab 5 Mg PO DAILY 30 Days


Reported


Trazodone (Trazodone HCl) 100 Mg Tablet 100 Mg PO HS


Gabapentin 300 Mg Cap 300 Mg PO TID


Vistaril (Hydroxyzine Pamoate) 25 Mg Cap 25 Mg PO TID PRN


Prozac (Fluoxetine HCl) 40 Mg Cap 40 Mg PO DAILY








Review of Systems


Except as stated in HPI:  all other systems reviewed are Neg





Physical Exam


Narrative


GENERAL: Well-nourished, well-developed  male patient, in no acute 

distress; afebrile, nontoxic.


SKIN: Warm and dry.  Right second toe with edema, erythema; sensory intact; no 

range of motion at the PIP joint; erythema extends to the metatarsal region of 

the foot; open wounds noted to the medial aspect of the toe that is draining a 

minimal amount of foul-smelling purulent drainage.  Right lower extremity 

supple and non-tense with 2+ pedal pulse and sensory intact.


HEAD: Atraumatic. Normocephalic. 


EYES: Pupils equal and round. No scleral icterus. No injection or drainage.


ENT: Mucosa pink and moist.  Airway patent.  


NECK: Trachea midline.  


CARDIOVASCULAR: Regular rate.  


RESPIRATORY: No accessory muscle use.


GASTROINTESTINAL: Flat.


MUSCULOSKELETAL:  No obvious deformities. No clubbing.  No cyanosis.  No edema. 


NEUROLOGICAL: Awake and alert.  Oriented 3.  No obvious cranial nerve 

deficits.  Motor grossly within normal limits. Normal speech. 


PSYCHIATRIC: Appropriate mood and affect; insight and judgment normal.





Data


Data


Last Documented VS





Vital Signs








  Date Time  Temp Pulse Resp B/P (MAP) Pulse Ox O2 Delivery O2 Flow Rate FiO2


 


10/26/17 19:00  83 14 113/65 (81) 98 Room Air  


 


10/26/17 15:44 99.0       








Orders





 Orders


Foot, Complete (Yon2agl) (10/26/17 17:39)


Basic Metabolic Panel (Bmp) (10/26/17 17:41)


Complete Blood Count With Diff (10/26/17 17:41)


Prothrombin Time / Inr (Pt) (10/26/17 17:41)


Act Partial Throm Time (Ptt) (10/26/17 17:41)


Iv Access Insert/Monitor (10/26/17 17:41)


Sodium Chlor 0.9% 1000 Ml Inj (Ns 1000 M (10/26/17 17:41)


Sodium Chloride 0.9% Flush (Ns Flush) (10/26/17 17:45)


Wound Culture And Gram Stain (10/26/17 17:41)


Uric Acid (10/26/17 17:46)


Sodium Chlor 0.9% 1000 Ml Inj (Ns 1000 M (10/26/17 17:46)


Sodium Chloride 0.9% Flush (Ns Flush) (10/26/17 18:00)


Vancomycin Inj (Vancomycin Inj) (10/26/17 18:00)


Ceftriaxone Inj (Rocephin Inj) (10/26/17 18:00)


Morphine Inj (Morphine Inj) (10/26/17 18:15)


Morphine Inj (Morphine Inj) (10/26/17 18:45)





Labs





Laboratory Tests








Test


  10/26/17


18:02


 


White Blood Count 9.3 TH/MM3 


 


Red Blood Count 4.72 MIL/MM3 


 


Hemoglobin 15.2 GM/DL 


 


Hematocrit 44.6 % 


 


Mean Corpuscular Volume 94.3 FL 


 


Mean Corpuscular Hemoglobin 32.2 PG 


 


Mean Corpuscular Hemoglobin


Concent 34.2 % 


 


 


Red Cell Distribution Width 12.6 % 


 


Platelet Count 362 TH/MM3 


 


Mean Platelet Volume 8.1 FL 


 


Neutrophils (%) (Auto) 66.7 % 


 


Lymphocytes (%) (Auto) 24.7 % 


 


Monocytes (%) (Auto) 7.8 % 


 


Eosinophils (%) (Auto) 0.4 % 


 


Basophils (%) (Auto) 0.4 % 


 


Neutrophils # (Auto) 6.2 TH/MM3 


 


Lymphocytes # (Auto) 2.3 TH/MM3 


 


Monocytes # (Auto) 0.7 TH/MM3 


 


Eosinophils # (Auto) 0.0 TH/MM3 


 


Basophils # (Auto) 0.0 TH/MM3 


 


CBC Comment DIFF FINAL 


 


Differential Comment  


 


Prothrombin Time 10.5 SEC 


 


Prothromb Time International


Ratio 1.0 RATIO 


 


 


Activated Partial


Thromboplast Time 27.6 SEC 


 


 


Blood Urea Nitrogen 7 MG/DL 


 


Creatinine 1.10 MG/DL 


 


Random Glucose 71 MG/DL 


 


Calcium Level 9.4 MG/DL 


 


Sodium Level 136 MEQ/L 


 


Potassium Level 4.0 MEQ/L 


 


Chloride Level 103 MEQ/L 


 


Carbon Dioxide Level 24.7 MEQ/L 


 


Anion Gap 8 MEQ/L 


 


Estimat Glomerular Filtration


Rate 71 ML/MIN 


 











[facility]


Medical Decision Making


Medical Screen Exam Complete:  Yes


Emergency Medical Condition:  Yes


Medical Record Reviewed:  Yes


Differential Diagnosis


Osteomyelitis, septic joint, cellulitis, infected wound


Narrative Course


52-year-old male with infected right second toe.  Dr. Villarreal evaluated the 

patient.  IV site obtained.  CBC, BMP, coags, normal saline bolus, morphine, 

uric acid, vancomycin and Rocephin ordered.  


1816:  Right foot x-ray concludes:  Abnormal appearance the PIP joint of the 

2nd digit with diffuse soft tissue swelling, bony fragmentation, and narrowing 

of the joint space.  Differential considerations include fracture, severe 

arthropathy, and septic arthritis.


1919:  CBC, coags, BMP unremarkable.  Call placed for patient admission.  


1945:  I spoke with LOLITA Werner, report given and patient will be admitted 

for 23 hour OBS.





Physician Communication


Physician Communication


LOLITA Werner





Diagnosis





 Primary Impression:  


 Septic arthritis of interphalangeal joint of toe of right foot





Admitting Information


Admitting Physician Requests:  Observation











Geno Rhoades [facility] Oct 26, 2017 17:39

## 2017-10-26 NOTE — HHI.HP
__________________________________________________





HPI


Service


[facility]


Primary Care Physician


No Primary Care Physician


Admission Diagnosis





Diagnoses:  


(1) Toe infection


Diagnosis:  Principal





(2) Dehydration


Diagnosis:  Principal





(3) Tobacco abuse


Diagnosis:  Principal





(4) Lactic acidosis


Diagnosis:  Principal





Travel History


International Travel<30 Days:  No


Contact w/Intl Traveler <30 Da:  No


Traveled to Known Affected Are:  No


History of Present Illness


This is a 52-year-old male with PMH of HTN, Anxiety and Polysubstance Abuse was 

into the ER with complaints of right foot toe swelling and redness x2 wks.  

Reports worsening symptoms in the last 1-2 days.  Denies injury/trauma.  No 

fever, chill, nausea, vomiting or diarrhea.  On arrival, /74, , O2 

sat 99% on RA, Temp 99.0.  CBC unremarkable.  Chemistry negative except for GFR 

71.  Lactic Acid 2.2.  INR 1.0.  Foot X-ray with abnormal appearance of PIP 

joint of second digit with diffuse soft tissue swelling, bony fragmentation and 

narrowing of the joint space.  S/p Wound Culture, Vanc/Rocephin in ER.  

Podiatry consulted by ER physician.





Review of Systems


Except as stated in HPI:  all other systems reviewed are Neg


ROS: 14 point review of systems otherwise negative.





Past Family Social History


Past Medical History


PMH:  HTN, Anxiety and Polysubstance Abuse


Past Surgical History


PAST SURGICAL HISTORY:  Left Eye Surgery


Allergies:  


Coded Allergies:  


     No Known Allergies (Verified , 10/26/17)


Family History


PAST FAMILY HISTORY:  Reviewed.  No h/o DM or CAD


Social History


PAST SOCIAL HISTORY:  Drinks daily.  Smokes 1/4ppd.  +Marijuana





Physical Exam


Vital Signs





Vital Signs








  Date Time  Temp Pulse Resp B/P (MAP) Pulse Ox O2 Delivery O2 Flow Rate FiO2


 


10/26/17 19:00  83 14 113/65 (81) 98 Room Air  


 


10/26/17 15:44 99.0 112 20 122/74 (90) 99   








Physical Exam


PE:


GENERAL: Middle-aged male in no acute distress.


HEENT: PERRLA, EOMI. No scleral icterus or conjunctival pallor. No lid lag or 

facial droop.  


CARDIOVASCULAR: Regular rate and rhythm.  No obvious murmurs to auscultation. 

No chest tenderness to palpation. 


RESPIRATORY: No obvious rhonchi or wheezing. Clear to auscultation. Breath 

sounds equal bilaterally. 


GASTROINTESTINAL: Abdomen soft, non-tender, nondistended. BS normal. 


MUSCULOSKELETAL: Extremities without clubbing, cyanosis, or edema. No obvious 

deformities. Right foot, 2nd toe w/ erythema/edam, decreased ROM due to pain/

swelling, open wound w/ purulent drainage. 


NEUROLOGICAL: Awake, alert and oriented x4. No focal neurologic deficits. 

Moving both upper and lower extremities spontaneously.


Laboratory





Laboratory Tests








Test


  10/26/17


18:02


 


White Blood Count 9.3 


 


Red Blood Count 4.72 


 


Hemoglobin 15.2 


 


Hematocrit 44.6 


 


Mean Corpuscular Volume 94.3 


 


Mean Corpuscular Hemoglobin 32.2 


 


Mean Corpuscular Hemoglobin


Concent 34.2 


 


 


Red Cell Distribution Width 12.6 


 


Platelet Count 362 


 


Mean Platelet Volume 8.1 


 


Neutrophils (%) (Auto) 66.7 


 


Lymphocytes (%) (Auto) 24.7 


 


Monocytes (%) (Auto) 7.8 


 


Eosinophils (%) (Auto) 0.4 


 


Basophils (%) (Auto) 0.4 


 


Neutrophils # (Auto) 6.2 


 


Lymphocytes # (Auto) 2.3 


 


Monocytes # (Auto) 0.7 


 


Eosinophils # (Auto) 0.0 


 


Basophils # (Auto) 0.0 


 


CBC Comment DIFF FINAL 


 


Differential Comment  


 


Prothrombin Time 10.5 


 


Prothromb Time International


Ratio 1.0 


 


 


Activated Partial


Thromboplast Time 27.6 


 


 


Blood Urea Nitrogen 7 


 


Creatinine 1.10 


 


Random Glucose 71 


 


Calcium Level 9.4 


 


Sodium Level 136 


 


Potassium Level 4.0 


 


Chloride Level 103 


 


Carbon Dioxide Level 24.7 


 


Anion Gap 8 


 


Estimat Glomerular Filtration


Rate 71 


 














 Date/Time


Source Procedure


Growth Status


 


 


 10/26/17 18:02


Wound Toe Gram Stain


Pending Received


 


 10/26/17 18:02


Wound Toe Wound Culture


Pending Received








Result Diagram:  


10/26/17 1802                                                                  

              10/26/17 1802








Caprini VTE Risk Assessment


Caprini VTE Risk Assessment:  No/Low Risk (score <= 1)


Caprini Risk Assessment Model











 Point Value = 1          Point Value = 2  Point Value = 3  Point Value = 5


 


Age 41-60


Minor surgery


BMI > 25 kg/m2


Swollen legs


Varicose veins


Pregnancy or postpartum


History of unexplained or recurrent


   spontaneous 


Oral contraceptives or hormone


   replacement


Sepsis (< 1 month)


Serious lung disease, including


   pneumonia (< 1 month)


Abnormal pulmonary function


Acute myocardial infarction


Congestive heart failure (< 1 month)


History of inflammatory bowel disease


Medical patient at bed rest Age 61-74


Arthroscopic surgery


Major open surgery (> 45 min)


Laparoscopic surgery (> 45 min)


Malignancy


Confined to bed (> 72 hours)


Immobilizing plaster cast


Central venous access Age >= 75


History of VTE


Family history of VTE


Factor V Leiden


Prothrombin 82764T


Lupus anticoagulant


Anticardiolipin antibodies


Elevated serum homocysteine


Heparin-induced thrombocytopenia


Other congenital or acquired


   thrombophilia Stroke (< 1 month)


Elective arthroplasty


Hip, pelvis, or leg fracture


Acute spinal cord injury (< 1 month)








Prophylaxis Regimen











   Total Risk


Factor Score Risk Level Prophylaxis Regimen


 


0-1      Low Early ambulation


 


2 Moderate Order ONE of the following:


*Sequential Compression Device (SCD)


*Heparin 5000 units SQ BID


 


3-4 Higher Order ONE of the following medications:


*Heparin 5000 units SQ TID


*Enoxaparin/Lovenox 40 mg SQ daily (WT < 150 kg, CrCl > 30 mL/min)


*Enoxaparin/Lovenox 30 mg SQ daily (WT < 150 kg, CrCl > 10-29 mL/min)


*Enoxaparin/Lovenox 30 mg SQ BID (WT < 150 kg, CrCl > 30 mL/min)


AND/OR


*Sequential Compression Device (SCD)


 


5 or more Highest Order ONE of the following medications:


*Heparin 5000 units SQ TID (Preferred with Epidurals)


*Enoxaparin/Lovenox 40 mg SQ daily (WT < 150 kg, CrCl > 30 mL/min)


*Enoxaparin/Lovenox 30 mg SQ daily (WT < 150 kg, CrCl > 10-29 mL/min)


*Enoxaparin/Lovenox 30 mg SQ BID (WT < 150 kg, CrCl > 30 mL/min)


AND


*Sequential Compression Device (SCD)











Assessment and Plan


Problem List:  


(1) Toe infection


ICD Code:  L08.9 - Local infection of the skin and subcutaneous tissue, 

unspecified


(2) Tobacco abuse


ICD Code:  Z72.0 - Tobacco use


Status:  Acute


(3) Dehydration


ICD Code:  E86.0 - Dehydration


(4) Lactic acidosis


ICD Code:  E87.2 - Acidosis


Assessment and Plan


A/P:


1.  Toe Infection:  Right 2nd Toe, ongoing x2 wks, worse in last 1-2 days.  X-

ray w/ abnormal appearance PIP joint of second digit with diffuse soft tissue 

swelling, images reviewed by me.  Podiatry consulted by ER physician  S/p Wound 

Culture, Vanc/Rocephin in ER.  Follow up cultures, continue IV Abx.  


2.  Dehydration:  GFR 71.  IVF for hydration, repeat labs in am. 


3.  Lactic Acidosis:  Mild.  Lactate 2.2, no evidence of sepsis, likely 

secondary to dehydration.  IVF, repeat Lactic Acid. 


4.  Tobacco Abuse:  Counselled.  NicoDerm prn. 


5.  DVT Prophylaxis:  Mechanical contraindication secondary to wound. 


6.  Social work for d/c planning as needed. 


7.  Case discussed w/ ER physician at length.











Bess Mahmood MD Oct 26, 2017 19:49

## 2017-10-26 NOTE — RADRPT
EXAM DATE/TIME:  10/26/2017 17:52 

 

[facility] COMPARISON:     

FOOT RIGHT COMPLETE ([id number]), November 15, 2016, 8:40.

 

                     

INDICATIONS :     

Right foot, 2nd digit swelling with no injury.

                     

 

MEDICAL HISTORY :     

None.          

 

SURGICAL HISTORY :        

ORIF right anlke.

 

ENCOUNTER:     

Initial                                        

 

ACUITY:     

2 weeks      

 

PAIN SCORE:     

10/10

 

LOCATION:     

Right  foot, 2nd digit.

 

FINDINGS:     

Three-view examination of the right foot demonstrates abnormal appearance to the PIP joint of the 2nd
 digit characterized by loss of the joint space, bony irregularity of the distal epiphysis of the 2nd
 digit and a small bony fragments both medial and lateral.  This is a new finding when compared to pr
ior conventional radiographs in November 2016.

 

Hypertrophic arthropathy with sclerosis and narrowing of the joint space of the 1st MTP joint is unch
anged from prior.  Distal fibular hardware with discontinuity of the long lag screw is unchanged from
 prior exam.  The remainder of the osseous structures of the forefoot are intact.

 

CONCLUSION:     

Abnormal appearance the PIP joint of the 2nd digit with diffuse soft tissue swelling, bony fragmentat
ion, and narrowing of the joint space.  Differential considerations include fracture, severe arthropa
thy, and septic arthritis.

 

 

 

 Lul Lane MD on October 26, 2017 at 18:07           

Board Certified Radiologist.

 This report was verified electronically.

## 2017-10-27 VITALS
HEART RATE: 80 BPM | DIASTOLIC BLOOD PRESSURE: 84 MMHG | RESPIRATION RATE: 24 BRPM | TEMPERATURE: 97.6 F | SYSTOLIC BLOOD PRESSURE: 147 MMHG | OXYGEN SATURATION: 100 %

## 2017-10-27 VITALS
TEMPERATURE: 98.5 F | RESPIRATION RATE: 22 BRPM | DIASTOLIC BLOOD PRESSURE: 65 MMHG | OXYGEN SATURATION: 99 % | HEART RATE: 62 BPM | SYSTOLIC BLOOD PRESSURE: 103 MMHG

## 2017-10-27 VITALS
TEMPERATURE: 97.4 F | OXYGEN SATURATION: 97 % | RESPIRATION RATE: 19 BRPM | DIASTOLIC BLOOD PRESSURE: 61 MMHG | HEART RATE: 77 BPM | SYSTOLIC BLOOD PRESSURE: 107 MMHG

## 2017-10-27 VITALS
HEART RATE: 77 BPM | DIASTOLIC BLOOD PRESSURE: 73 MMHG | RESPIRATION RATE: 24 BRPM | SYSTOLIC BLOOD PRESSURE: 125 MMHG | TEMPERATURE: 98.5 F | OXYGEN SATURATION: 100 %

## 2017-10-27 VITALS
HEART RATE: 78 BPM | OXYGEN SATURATION: 100 % | TEMPERATURE: 98 F | RESPIRATION RATE: 20 BRPM | DIASTOLIC BLOOD PRESSURE: 62 MMHG | SYSTOLIC BLOOD PRESSURE: 121 MMHG

## 2017-10-27 VITALS
SYSTOLIC BLOOD PRESSURE: 110 MMHG | DIASTOLIC BLOOD PRESSURE: 69 MMHG | HEART RATE: 88 BPM | TEMPERATURE: 98.1 F | OXYGEN SATURATION: 98 % | RESPIRATION RATE: 18 BRPM

## 2017-10-27 LAB
ALP SERPL-CCNC: 80 U/L (ref 45–117)
ALT SERPL-CCNC: 28 U/L (ref 12–78)
ANION GAP SERPL CALC-SCNC: 6 MEQ/L (ref 5–15)
AST SERPL-CCNC: 25 U/L (ref 15–37)
BASOPHILS # BLD AUTO: 0 TH/MM3 (ref 0–0.2)
BASOPHILS NFR BLD: 0.8 % (ref 0–2)
BILIRUB SERPL-MCNC: 0.6 MG/DL (ref 0.2–1)
BUN SERPL-MCNC: 10 MG/DL (ref 7–18)
CHLORIDE SERPL-SCNC: 109 MEQ/L (ref 98–107)
EOSINOPHIL # BLD: 0.1 TH/MM3 (ref 0–0.4)
EOSINOPHIL NFR BLD: 1.7 % (ref 0–4)
ERYTHROCYTE [DISTWIDTH] IN BLOOD BY AUTOMATED COUNT: 12.4 % (ref 11.6–17.2)
GFR SERPLBLD BASED ON 1.73 SQ M-ARVRAT: 90 ML/MIN (ref 89–?)
HCO3 BLD-SCNC: 23.7 MEQ/L (ref 21–32)
HCT VFR BLD CALC: 37.8 % (ref 39–51)
HEMO FLAGS: (no result)
INR PPP: 1 RATIO
LYMPHOCYTES # BLD AUTO: 1.7 TH/MM3 (ref 1–4.8)
LYMPHOCYTES NFR BLD AUTO: 42.4 % (ref 9–44)
MCH RBC QN AUTO: 32 PG (ref 27–34)
MCHC RBC AUTO-ENTMCNC: 33.8 % (ref 32–36)
MCV RBC AUTO: 94.7 FL (ref 80–100)
MONOCYTES NFR BLD: 11.4 % (ref 0–8)
NEUTROPHILS # BLD AUTO: 1.8 TH/MM3 (ref 1.8–7.7)
NEUTROPHILS NFR BLD AUTO: 43.7 % (ref 16–70)
PLATELET # BLD: 256 TH/MM3 (ref 150–450)
POTASSIUM SERPL-SCNC: 3.9 MEQ/L (ref 3.5–5.1)
PROTHROMBIN TIME: 10.5 SEC (ref 9.8–11.6)
RBC # BLD AUTO: 3.99 MIL/MM3 (ref 4.5–5.9)
SODIUM SERPL-SCNC: 139 MEQ/L (ref 136–145)
WBC # BLD AUTO: 4 TH/MM3 (ref 4–11)

## 2017-10-27 RX ADMIN — PHENYTOIN SODIUM SCH MLS/HR: 50 INJECTION INTRAMUSCULAR; INTRAVENOUS at 16:46

## 2017-10-27 RX ADMIN — STANDARDIZED SENNA CONCENTRATE AND DOCUSATE SODIUM SCH TAB: 8.6; 5 TABLET, FILM COATED ORAL at 21:21

## 2017-10-27 RX ADMIN — STANDARDIZED SENNA CONCENTRATE AND DOCUSATE SODIUM SCH TAB: 8.6; 5 TABLET, FILM COATED ORAL at 08:33

## 2017-10-27 RX ADMIN — GABAPENTIN SCH MG: 300 CAPSULE ORAL at 08:32

## 2017-10-27 RX ADMIN — CEFEPIME SCH MLS/HR: 1 INJECTION, POWDER, FOR SOLUTION INTRAMUSCULAR; INTRAVENOUS at 21:21

## 2017-10-27 RX ADMIN — FOLIC ACID SCH MG: 1 TABLET ORAL at 08:33

## 2017-10-27 RX ADMIN — OXYCODONE HYDROCHLORIDE AND ACETAMINOPHEN PRN TAB: 10; 325 TABLET ORAL at 21:23

## 2017-10-27 RX ADMIN — MORPHINE SULFATE PRN MG: 2 INJECTION, SOLUTION INTRAMUSCULAR; INTRAVENOUS at 12:10

## 2017-10-27 RX ADMIN — Medication SCH ML: at 21:22

## 2017-10-27 RX ADMIN — GABAPENTIN SCH MG: 300 CAPSULE ORAL at 18:35

## 2017-10-27 RX ADMIN — Medication SCH ML: at 08:32

## 2017-10-27 RX ADMIN — LISINOPRIL SCH MG: 10 TABLET ORAL at 08:29

## 2017-10-27 RX ADMIN — MORPHINE SULFATE PRN MG: 2 INJECTION, SOLUTION INTRAMUSCULAR; INTRAVENOUS at 08:31

## 2017-10-27 RX ADMIN — Medication SCH MG: at 08:33

## 2017-10-27 RX ADMIN — GABAPENTIN SCH MG: 300 CAPSULE ORAL at 12:55

## 2017-10-27 RX ADMIN — HYDROCODONE BITARTRATE AND ACETAMINOPHEN PRN TAB: 5; 325 TABLET ORAL at 02:48

## 2017-10-27 RX ADMIN — SODIUM CHLORIDE SCH MLS/HR: 900 INJECTION INTRAVENOUS at 16:46

## 2017-10-27 RX ADMIN — HEPARIN SODIUM SCH UNITS: 10000 INJECTION, SOLUTION INTRAVENOUS; SUBCUTANEOUS at 08:32

## 2017-10-27 RX ADMIN — MULTIPLE VITAMINS W/ MINERALS TAB SCH TAB: TAB at 08:33

## 2017-10-27 RX ADMIN — MORPHINE SULFATE PRN MG: 2 INJECTION, SOLUTION INTRAMUSCULAR; INTRAVENOUS at 05:19

## 2017-10-27 RX ADMIN — CEFEPIME SCH MLS/HR: 1 INJECTION, POWDER, FOR SOLUTION INTRAMUSCULAR; INTRAVENOUS at 08:32

## 2017-10-27 RX ADMIN — HEPARIN SODIUM SCH UNITS: 10000 INJECTION, SOLUTION INTRAVENOUS; SUBCUTANEOUS at 18:35

## 2017-10-27 RX ADMIN — PHENYTOIN SODIUM SCH MLS/HR: 50 INJECTION INTRAMUSCULAR; INTRAVENOUS at 08:31

## 2017-10-27 RX ADMIN — OXYCODONE HYDROCHLORIDE AND ACETAMINOPHEN PRN TAB: 10; 325 TABLET ORAL at 16:45

## 2017-10-27 NOTE — PD.POD.CON
Patient Intake


Chief Complaint


Infected second toe right foot


Consult Requested by


Dr. Mahmood


Reason for Consult


Evaluation and treatment of second toe infection right foot


Primary Care Physician


No Primary Care Physician


History of Present Illness


Patient is a 52-year-old  who presents for an infected toe of the right 

foot.  He states approximately 2-3 weeks ago he thought he had an athlete's 

foot and has been soaking the toe and applying antifungal topical treatment.  

The toe started to swell and become painful yesterday.  Patient presented to 

[facility] ED.  Radiograph showed destruction of the distal interphalangeal joint 

consistent either with septic arthritis or osteomyelitis.


Coded Allergies:  


     No Known Allergies (Verified , 10/26/17)


Preferred Language to Discuss:  English


Barriers to Learning:  None


Teaching Method:  Discussion





 Vital Signs








  Date Time  Temp Pulse Resp B/P (MAP) Pulse Ox O2 Delivery O2 Flow Rate FiO2


 


10/27/17 08:22 98.5 62 22 103/65 (78) 99   


 


10/27/17 05:05 97.4 77 19 107/61 (76) 97   


 


10/27/17 00:16 98.1 88 18 110/69 (83) 98   


 


10/26/17 21:07 98.3 80 18 143/86 (105) 100   


 


10/26/17 19:00  83 14 113/65 (81) 98 Room Air  


 


10/26/17 15:44 99.0 112 20 122/74 (90) 99   








Pain scale used:  0-10 numeric scale


Pain score:  6


Medications





Current Medications


Sodium Chloride 1,000 ml @  1,000 mls/hr Q1H IV  Last administered on 10/26/

17at 18:10;  Start 10/26/17 at 17:41;  Stop 10/26/17 at 18:40;  Status DC


Sodium Chloride (NS Flush) 2 ml UNSCH  PRN IV FLUSH FLUSH AFTER USING IV ACCESS

;  Start 10/26/17 at 17:45;  Stop 10/26/17 at 19:57;  Status DC


Sodium Chloride 1,000 ml @  1,000 mls/hr Q1H IV  Last administered on 10/26/

17at 18:31;  Start 10/26/17 at 17:46;  Stop 10/26/17 at 18:45;  Status DC


Sodium Chloride (NS Flush) 2 ml UNSCH  PRN IV FLUSH FLUSH AFTER USING IV ACCESS

;  Start 10/26/17 at 18:00;  Stop 10/26/17 at 19:57;  Status DC


Vancomycin HCl 1250 mg/Sodium Chloride 262.5 ml @  262.5 mls/ hr ONCE  ONCE IV  

Last administered on 10/26/17at 19:38;  Start 10/26/17 at 18:00;  Stop 10/26/17 

at 18:59;  Status DC


Ceftriaxone Sodium 1000 mg/ Sodium Chloride 100 ml @  200 mls/hr ONCE  ONCE IV  

Last administered on 10/26/17at 18:03;  Start 10/26/17 at 18:00;  Stop 10/26/17 

at 18:29;  Status DC


Morphine Sulfate (Morphine Inj) 4 mg ONCE  ONCE IV PUSH  Last administered on 10

/26/17at 18:10;  Start 10/26/17 at 18:15;  Stop 10/26/17 at 18:16;  Status DC


Morphine Sulfate (Morphine Inj) 2 mg ONCE  ONCE IV PUSH  Last administered on 10

/26/17at 18:55;  Start 10/26/17 at 18:45;  Stop 10/26/17 at 18:46;  Status DC


Pharmacy Profile Note 0 ml @ 0 mls/hr UNSCH OTHER ;  Start 10/26/17 at 19:45


Cefepime HCl 1000 mg/Sodium Chloride 100 ml @  200 mls/hr Q12H IV  Last 

administered on 10/27/17at 08:32;  Start 10/27/17 at 09:00


Sodium Chloride 1,000 ml @  100 mls/hr Q10H IV  Last administered on 10/27/17at 

08:31;  Start 10/26/17 at 20:00


Sodium Chloride (NS Flush) 2 ml UNSCH  PRN IV FLUSH FLUSH AFTER USING IV ACCESS

;  Start 10/26/17 at 19:45


Sodium Chloride (NS Flush) 2 ml BID IV FLUSH  Last administered on 10/27/17at 08

:32;  Start 10/26/17 at 21:00


Heparin Sodium (Porcine) (Heparin Inj) 5,000 units Q8H SQ  Last administered on 

10/27/17at 08:32;  Start 10/27/17 at 09:00


Acetaminophen (Tylenol) 650 mg Q6H  PRN PO FEVER/PAIN SCALE 1 TO 2;  Start 10/26

/17 at 19:45


Acetaminophen/ Hydrocodone Bitart (Norco  5-325 Mg) 1 tab Q4H  PRN PO PAIN 

SCALE 3 TO 5 Last administered on 10/27/17at 02:48;  Start 10/26/17 at 19:45


Morphine Sulfate (Morphine Inj) 2 mg Q3H  PRN IV PUSH Pain 6-10 Last 

administered on 10/27/17at 08:31;  Start 10/26/17 at 19:45


Senna/Docusate Sodium (Estelita-Colace) 1 tab BID PO  Last administered on 10/27/

17at 08:33;  Start 10/26/17 at 21:00


Magnesium Hydroxide (Milk Of Magnesia Liq) 30 ml Q12H  PRN PO Mild constipation

;  Start 10/26/17 at 19:45


Sennosides (Senokot) 17.2 mg Q12H  PRN PO Moderate constipation;  Start 10/26/

17 at 19:45


Bisacodyl (Dulcolax Supp) 10 mg DAILY  PRN RECTAL SEVERE CONSITIPATION;  Start 

10/26/17 at 19:45


Lactulose (Lactulose Liq) 30 ml DAILY  PRN PO SEVERE CONSITIPATION;  Start 10/26

/17 at 19:45


Amlodipine Besylate (Norvasc) 5 mg DAILY PO ;  Start 10/27/17 at 09:00


Gabapentin (Neurontin) 300 mg TID PO  Last administered on 10/27/17at 08:32;  

Start 10/27/17 at 09:00


Hydroxyzine Pamoate (Vistaril) 25 mg TID  PRN PO MODERATE TO SEVERE ANXIETY;  

Start 10/26/17 at 22:45


Lisinopril (Prinivil) 20 mg DAILY PO ;  Start 10/27/17 at 09:00


Fluoxetine HCl (PROzac) 40 mg DAILY PO  Last administered on 10/27/17at 08:33;  

Start 10/27/17 at 09:00


Trazodone HCl (Desyrel) 100 mg HS PO  Last administered on 10/27/17at 00:26;  

Start 10/26/17 at 22:45


Folic Acid (Folate) 1 mg DAILY PO  Last administered on 10/27/17at 08:33;  

Start 10/27/17 at 09:00;  Stop 11/1/17 at 08:59


Thiamine HCl (Vitamin B1) 100 mg DAILY PO  Last administered on 10/27/17at 08:33

;  Start 10/27/17 at 09:00


Multivitamins/ Minerals Therapeutic (Theragran M Tab) 1 tab DAILY PO  Last 

administered on 10/27/17at 08:33;  Start 10/27/17 at 09:00;  Stop 11/1/17 at 08:

59


Flumazenil (Romazicon Inj) 0.2 mg Q1M  PRN IV PUSH SEE LABEL COMMENTS;  Start 10

/26/17 at 22:45


Lorazepam (Ativan) 1 mg Q4H  PRN PO CIWA 8 - 10;  Start 10/26/17 at 22:45


Lorazepam (Ativan Inj) 1 mg Q4H  PRN IV PUSH CIWA 8 - 10;  Start 10/26/17 at 22:

45


Lorazepam (Ativan) 2 mg Q2H  PRN PO CIWA 11-14;  Start 10/26/17 at 22:45


Lorazepam (Ativan Inj) 2 mg Q2H  PRN IV PUSH CIWA 11-14;  Start 10/26/17 at 22:

45


Lorazepam (Ativan Inj) 2 mg Q1H  PRN IV PUSH CIWA 15-20;  Start 10/26/17 at 22:

45


Lorazepam (Ativan Inj) 2 mg Q15M  PRN IV PUSH CIWA > 20;  Start 10/26/17 at 22:

45


Haloperidol Lactate (Haldol Inj) 2 mg Q15M  PRN IM SEE LABEL COMMENTS;  Start 10

/26/17 at 22:45





Past, Family & Social History


Past Medical History


PFSH Reviewed:  Yes


Cardiovascular:  REPORTS HX OF: Hypertension





Review of Systems


Constitutional:  COMPLAINS OF: Pain


Cardiovascular:  COMPLAINS OF: Hx hypertension


Musculoskeletal:  COMPLAINS OF: Deformaties





Exam-Podiatry


Constitutional


General appearance:  comfortable


Nutritional status:  normal


Orientation:  alert and oriented x3





Dermatological Exam


Skin Temp - Right:  Within Normal Limits


Skin Texture - Right:  Within Normal Limits


Skin Elasticity - Right:  Within Normal Limits


Skin Tugor - Right:  Within Normal Limits


Hair Growth - Right:  Within Normal Limits


Pigmentation - Right:  Within Normal Limits


Skin Temp - Left:  Within Normal Limits


Skin Texture - Left:  Within Normal Limits


Skin Elasticity - Left:  Within Normal Limits


Skin Tugor - Left:  Within Normal Limits


Hair Growth - Left:  Within Normal Limits


Pigmentation - Left:  Within Normal Limits


Ulcers: Location/Measurements


Second toe right foot is reddened and swollen with an ulceration on the medial 

aspect of the DIPJ.  Some drainage is noted from the area.





Vascular/Lymphatic Exam


R Dorsails Pedis:  Palpable


L Dorsails Pedis:  Palpable


R Posterior Tibial:  Palpable


L Posterior Tibial:  Palpable





Neurologic Exam


Details


No neurological deficits seen





Muscle Strength


Dorsiflexion (Right):  Normal


Plantarflexion (Right):  Normal


Inversion (Right):  Normal


Eversion (Right):  Normal


Digital (Right):  Normal


Dorsiflexion (Left):  Normal


Plantarflexion (Left):  Normal


Inversion (Left):  Normal


Eversion (Left):  Normal


Digital (Left):  Normal





Foot Range of Motion


Dorsiflexion (Right):  Normal


 Plantarflexion (Right):  Normal


Inversion (Right):  Normal


Eversion (Right):  Normal


Digital (Right):  Normal


Dorsiflexion (Left):  Normal


Plantarflexion (Left):  Normal


Inversion (Left):  Normal


Eversion (Left):  Normal


Digital (Left):  Normal





Joint Instability


Mallet Toe:  Toe #2 (R)





Lab and Radiology Results


Laboratory





Laboratory Tests








Test


  10/26/17


18:02 10/27/17


07:00


 


White Blood Count 9.3 TH/MM3  4.0 TH/MM3 


 


Red Blood Count 4.72 MIL/MM3  3.99 MIL/MM3 


 


Hemoglobin 15.2 GM/DL  12.8 GM/DL 


 


Hematocrit 44.6 %  37.8 % 


 


Mean Corpuscular Volume 94.3 FL  94.7 FL 


 


Mean Corpuscular Hemoglobin 32.2 PG  32.0 PG 


 


Mean Corpuscular Hemoglobin


Concent 34.2 % 


  33.8 % 


 


 


Red Cell Distribution Width 12.6 %  12.4 % 


 


Platelet Count 362 TH/MM3  256 TH/MM3 


 


Mean Platelet Volume 8.1 FL  7.8 FL 


 


Neutrophils (%) (Auto) 66.7 %  43.7 % 


 


Lymphocytes (%) (Auto) 24.7 %  42.4 % 


 


Monocytes (%) (Auto) 7.8 %  11.4 % 


 


Eosinophils (%) (Auto) 0.4 %  1.7 % 


 


Basophils (%) (Auto) 0.4 %  0.8 % 


 


Neutrophils # (Auto) 6.2 TH/MM3  1.8 TH/MM3 


 


Lymphocytes # (Auto) 2.3 TH/MM3  1.7 TH/MM3 


 


Monocytes # (Auto) 0.7 TH/MM3  0.5 TH/MM3 


 


Eosinophils # (Auto) 0.0 TH/MM3  0.1 TH/MM3 


 


Basophils # (Auto) 0.0 TH/MM3  0.0 TH/MM3 


 


CBC Comment DIFF FINAL  DIFF FINAL 


 


Differential Comment    








Laboratory Tests








Test


  10/26/17


18:02 10/27/17


07:00


 


Blood Urea Nitrogen 7 MG/DL  10 MG/DL 


 


Creatinine 1.10 MG/DL  0.89 MG/DL 


 


Random Glucose 71 MG/DL  103 MG/DL 


 


Calcium Level 9.4 MG/DL  8.5 MG/DL 


 


Sodium Level 136 MEQ/L  139 MEQ/L 


 


Potassium Level 4.0 MEQ/L  3.9 MEQ/L 


 


Chloride Level 103 MEQ/L  109 MEQ/L 


 


Carbon Dioxide Level 24.7 MEQ/L  23.7 MEQ/L 


 


Anion Gap 8 MEQ/L  6 MEQ/L 


 


Estimat Glomerular Filtration


Rate 71 ML/MIN 


  90 ML/MIN 


 


 


Uric Acid 2.2 MG/DL  


 


Total Protein  6.1 GM/DL 


 


Albumin  2.7 GM/DL 


 


Alkaline Phosphatase  80 U/L 


 


Aspartate Amino Transf


(AST/SGOT) 


  25 U/L 


 


 


Alanine Aminotransferase


(ALT/SGPT) 


  28 U/L 


 


 


Total Bilirubin  0.6 MG/DL 


 


Lactic Acid Level  1.2 mmol/L 








Microbiology








 Date/Time


Source Procedure


Growth Status


 


 


 10/26/17 18:02


Wound Toe Gram Stain


Pending Received


 


 10/26/17 18:02


Wound Toe Wound Culture


Pending Received











Radiology





Last Impressions








Foot X-Ray 10/26/17 8019 Signed





Impressions: 





 Service Date/Time:  Thursday, October 26, 2017 17:52 - CONCLUSION:  Abnormal 





 appearance the PIP joint of the 2nd digit with diffuse soft tissue swelling, 





 bony fragmentation, and narrowing of the joint space.  Differential 





 considerations include fracture, severe arthropathy, and septic arthritis.     





 Lul Lane MD 











Assessment/Plan


Problem List:  


(1) Osteomyelitis of ankle or foot


Status:  Acute


(2) Septic arthritis of interphalangeal joint of toe of right foot


Status:  Acute


(3) Tobacco abuse


Status:  Acute


(4) Toe infection


Status:  Acute


Additional information


PLAN:





Discussion with the patient about etiology of bone infection and various 

treatment options.  Discussed surgical management.  Patient would require an 

amputation of the second toe of the right foot.  He wishes to proceed with this 

option.  Surgery scheduled for Saturday morning.  Preop consent and orders 

written.  Patient is not to take baths or showers.  Sponge bathe only.











Julius Goodrich DPM Oct 27, 2017 09:27

## 2017-10-27 NOTE — HHI.PR
Subjective


Remarks


Patient c/o pain in his toe, swelling and redness is not improved. No n/v/d/c. 

No fever or chills. Denies chest pain or sob. 


Seen by podiatry plan for amputation tomorrow.





Objective


Vitals





Vital Signs








  Date Time  Temp Pulse Resp B/P (MAP) Pulse Ox O2 Delivery O2 Flow Rate FiO2


 


10/27/17 12:21 98.5 77 24 125/73 (90) 100   


 


10/27/17 09:19   16     


 


10/27/17 08:22 98.5 62 22 103/65 (78) 99   


 


10/27/17 05:05 97.4 77 19 107/61 (76) 97   


 


10/27/17 00:16 98.1 88 18 110/69 (83) 98   


 


10/26/17 21:07 98.3 80 18 143/86 (105) 100   


 


10/26/17 19:00  83 14 113/65 (81) 98 Room Air  


 


10/26/17 15:44 99.0 112 20 122/74 (90) 99   














I/O      


 


 10/26/17 10/26/17 10/26/17 10/27/17 10/27/17 10/27/17





 07:00 15:00 23:00 07:00 15:00 23:00


 


Intake Total   2362.5 ml   


 


Balance   2362.5 ml   


 


      


 


Intake IV Total   2362.5 ml   


 


# Voids     1 








Result Diagram:  


10/27/17 0700                                                                  

              10/27/17 0700





Imaging





Last Impressions








Chest X-Ray 10/27/17 0000 Signed





Impressions: 





 Service Date/Time:  Friday, October 27, 2017 10:24 - CONCLUSION:  No acute 





 cardiopulmonary disease.     Lul Lane MD 


 


Foot X-Ray 10/26/17 1739 Signed





Impressions: 





 Service Date/Time:  Thursday, October 26, 2017 17:52 - CONCLUSION:  Abnormal 





 appearance the PIP joint of the 2nd digit with diffuse soft tissue swelling, 





 bony fragmentation, and narrowing of the joint space.  Differential 





 considerations include fracture, severe arthropathy, and septic arthritis.     





 Llu Lane MD 








Objective Remarks


GENERAL: Middle-aged male in no acute distress.


HEENT: PERRLA, EOMI. No scleral icterus or conjunctival pallor. No lid lag or 

facial droop.  


CARDIOVASCULAR: Regular rate and rhythm.  No obvious murmurs to auscultation. 

No chest tenderness to palpation. 


RESPIRATORY: No obvious rhonchi or wheezing. Clear to auscultation. Breath 

sounds equal bilaterally. 


GASTROINTESTINAL: Abdomen soft, non-tender, nondistended. BS normal. 


MUSCULOSKELETAL: Extremities without clubbing, cyanosis, or edema. No obvious 

deformities. Right foot, 2nd toe w/ erythema/edam, decreased ROM due to pain/

swelling, open wound w/ purulent drainage. 


NEUROLOGICAL: Awake, alert and oriented x4. No focal neurologic deficits. 

Moving both upper and lower extremities spontaneously.





A/P


Problem List:  


(1) Toe infection


ICD Code:  L08.9 - Local infection of the skin and subcutaneous tissue, 

unspecified


Status:  Acute


(2) Tobacco abuse


ICD Code:  Z72.0 - Tobacco use


Status:  Acute


(3) Dehydration


ICD Code:  E86.0 - Dehydration


(4) Lactic acidosis


ICD Code:  E87.2 - Acidosis


Assessment and Plan


Osteomyelitis of ankle or foot


Septic arthritis of interphalangeal joint of toe of right foot





Right 2nd Toe, ongoing x2 wks, worse in last 1-2 days.  


X-ray reviewed abnormal appearance PIP joint of second digit with diffuse soft 

tissue swelling, images reviewed by me.  


Podiatry consulted by ER physician  


Seen by Dr Goodrich podiatry appreciate recommendations. Recommends 

amputation of the second toe of the right foot. Surgery scheduled for Saturday 

morning 10/28/17.  Preop consent and orders written by Dr Goodrich. NPO after 

midnight.  Patient is not to take baths or showers.  Sponge bathe only.


S/p Wound Culture. 


Continue Vanc/Rocephin IV.  Follow up cultures.


Pain meds changed to  PO percocet per pain scale and IV morphine IV for 

breakthrough pain. Monitor VS closely. 


 


Dehydration:  Improving. GFR 71 on admission.  IVF for hydration, repeat labs 

in am. 





Lactic Acidosis:  Mild.  Lactate 2.2, no evidence of sepsis, likely secondary 

to dehydration.  IVF, repeat Lactic Acid normal 1.2. 





Tobacco Abuse:  Counselled.  NicoDerm prn. 





DVT Prophylaxis:  Mechanical contraindication secondary to wound. 


CM consulted for d/c planning as needed. 


Discussed with the patient nurse, Dr Goodrich podiatry











Marleny Garcia MD Oct 27, 2017 14:36

## 2017-10-27 NOTE — RADRPT
EXAM DATE/TIME:  10/27/2017 10:24 

 

[facility] COMPARISON:     

No previous studies available for comparison.

 

                     

INDICATIONS :     

Evaluate for pneumonia, pneumothorax or communicable disease. Pre op foot surgery for cellulitis.

                     

 

MEDICAL HISTORY :     

None.          

 

SURGICAL HISTORY :     

None.   

 

ENCOUNTER:     

Initial                                        

 

ACUITY:     

1 day      

 

PAIN SCORE:     

0/10

 

LOCATION:     

Bilateral chest 

 

FINDINGS:     

PA and lateral views of the chest demonstrate the lungs to be symmetrically aerated without evidence 
of mass, infiltrate or effusion.  The cardiomediastinal contours are unremarkable.  Mild degenerative
 changes in the midthoracic spine.

 

CONCLUSION:     

No acute cardiopulmonary disease.

 

 

 

 Lul Lane MD on October 27, 2017 at 11:12           

Board Certified Radiologist.

 This report was verified electronically.

## 2017-10-28 VITALS
HEART RATE: 68 BPM | RESPIRATION RATE: 21 BRPM | OXYGEN SATURATION: 98 % | DIASTOLIC BLOOD PRESSURE: 86 MMHG | SYSTOLIC BLOOD PRESSURE: 158 MMHG | TEMPERATURE: 96.2 F

## 2017-10-28 VITALS
DIASTOLIC BLOOD PRESSURE: 84 MMHG | RESPIRATION RATE: 16 BRPM | TEMPERATURE: 96.5 F | OXYGEN SATURATION: 99 % | HEART RATE: 74 BPM | SYSTOLIC BLOOD PRESSURE: 147 MMHG

## 2017-10-28 VITALS
HEART RATE: 61 BPM | RESPIRATION RATE: 17 BRPM | OXYGEN SATURATION: 99 % | TEMPERATURE: 96.2 F | DIASTOLIC BLOOD PRESSURE: 97 MMHG | SYSTOLIC BLOOD PRESSURE: 156 MMHG

## 2017-10-28 VITALS
DIASTOLIC BLOOD PRESSURE: 95 MMHG | RESPIRATION RATE: 18 BRPM | HEART RATE: 64 BPM | OXYGEN SATURATION: 100 % | SYSTOLIC BLOOD PRESSURE: 162 MMHG | TEMPERATURE: 96.6 F

## 2017-10-28 VITALS
RESPIRATION RATE: 20 BRPM | SYSTOLIC BLOOD PRESSURE: 129 MMHG | TEMPERATURE: 95.6 F | DIASTOLIC BLOOD PRESSURE: 75 MMHG | HEART RATE: 70 BPM | OXYGEN SATURATION: 98 %

## 2017-10-28 VITALS
TEMPERATURE: 96.3 F | OXYGEN SATURATION: 98 % | RESPIRATION RATE: 20 BRPM | DIASTOLIC BLOOD PRESSURE: 86 MMHG | SYSTOLIC BLOOD PRESSURE: 144 MMHG | HEART RATE: 82 BPM

## 2017-10-28 LAB — GFR SERPLBLD BASED ON 1.73 SQ M-ARVRAT: 95 ML/MIN (ref 89–?)

## 2017-10-28 PROCEDURE — 0Y6R0Z0 DETACHMENT AT RIGHT 2ND TOE, COMPLETE, OPEN APPROACH: ICD-10-PCS | Performed by: PODIATRIST

## 2017-10-28 RX ADMIN — FOLIC ACID SCH MG: 1 TABLET ORAL at 09:22

## 2017-10-28 RX ADMIN — PHENYTOIN SODIUM SCH MLS/HR: 50 INJECTION INTRAMUSCULAR; INTRAVENOUS at 21:41

## 2017-10-28 RX ADMIN — GABAPENTIN SCH MG: 300 CAPSULE ORAL at 09:22

## 2017-10-28 RX ADMIN — OXYCODONE HYDROCHLORIDE AND ACETAMINOPHEN PRN TAB: 10; 325 TABLET ORAL at 02:57

## 2017-10-28 RX ADMIN — PHENYTOIN SODIUM SCH MLS/HR: 50 INJECTION INTRAMUSCULAR; INTRAVENOUS at 01:26

## 2017-10-28 RX ADMIN — HEPARIN SODIUM SCH UNITS: 10000 INJECTION, SOLUTION INTRAVENOUS; SUBCUTANEOUS at 19:07

## 2017-10-28 RX ADMIN — PHENYTOIN SODIUM SCH MLS/HR: 50 INJECTION INTRAMUSCULAR; INTRAVENOUS at 12:00

## 2017-10-28 RX ADMIN — GABAPENTIN SCH MG: 300 CAPSULE ORAL at 13:00

## 2017-10-28 RX ADMIN — Medication SCH MG: at 09:22

## 2017-10-28 RX ADMIN — Medication SCH ML: at 09:24

## 2017-10-28 RX ADMIN — MULTIPLE VITAMINS W/ MINERALS TAB SCH TAB: TAB at 09:21

## 2017-10-28 RX ADMIN — CEFEPIME SCH MLS/HR: 1 INJECTION, POWDER, FOR SOLUTION INTRAMUSCULAR; INTRAVENOUS at 21:36

## 2017-10-28 RX ADMIN — STANDARDIZED SENNA CONCENTRATE AND DOCUSATE SODIUM SCH TAB: 8.6; 5 TABLET, FILM COATED ORAL at 09:23

## 2017-10-28 RX ADMIN — HEPARIN SODIUM SCH UNITS: 10000 INJECTION, SOLUTION INTRAVENOUS; SUBCUTANEOUS at 09:25

## 2017-10-28 RX ADMIN — OXYCODONE HYDROCHLORIDE AND ACETAMINOPHEN PRN TAB: 10; 325 TABLET ORAL at 07:56

## 2017-10-28 RX ADMIN — SODIUM CHLORIDE SCH MLS/HR: 900 INJECTION INTRAVENOUS at 17:04

## 2017-10-28 RX ADMIN — CEFEPIME SCH MLS/HR: 1 INJECTION, POWDER, FOR SOLUTION INTRAMUSCULAR; INTRAVENOUS at 09:24

## 2017-10-28 RX ADMIN — HEPARIN SODIUM SCH UNITS: 10000 INJECTION, SOLUTION INTRAVENOUS; SUBCUTANEOUS at 17:12

## 2017-10-28 RX ADMIN — GABAPENTIN SCH MG: 300 CAPSULE ORAL at 17:03

## 2017-10-28 RX ADMIN — Medication SCH ML: at 21:40

## 2017-10-28 RX ADMIN — SODIUM CHLORIDE SCH MLS/HR: 900 INJECTION INTRAVENOUS at 03:00

## 2017-10-28 RX ADMIN — LISINOPRIL SCH MG: 10 TABLET ORAL at 09:23

## 2017-10-28 RX ADMIN — HEPARIN SODIUM SCH UNITS: 10000 INJECTION, SOLUTION INTRAVENOUS; SUBCUTANEOUS at 01:26

## 2017-10-28 RX ADMIN — STANDARDIZED SENNA CONCENTRATE AND DOCUSATE SODIUM SCH TAB: 8.6; 5 TABLET, FILM COATED ORAL at 21:00

## 2017-10-28 NOTE — HHI.PR
Subjective


Remarks


Plan for amputation of second toe of right foot.  Patient has no new complaints 

today.  He is nothing by mouth but complains of no diarrhea, nausea or vomiting 

presently.





Objective





Vital Signs








  Date Time  Temp Pulse Resp B/P (MAP) Pulse Ox O2 Delivery O2 Flow Rate FiO2


 


10/28/17 12:00 96.2 61 17 156/97 (116) 99   


 


10/28/17 08:00 96.5 74 16 147/84 (105) 99   


 


10/28/17 04:00 95.6 70 20 129/75 (93) 98   


 


10/28/17 00:00 96.3 82 20 144/86 (105) 98   


 


10/27/17 20:41 98.0 78 20 121/62 (81) 100   


 


10/27/17 18:18   17     


 


10/27/17 16:10 97.6 80 24 147/84 (105) 100   














I/O      


 


 10/27/17 10/27/17 10/27/17 10/28/17 10/28/17 10/28/17





 07:00 15:00 23:00 07:00 15:00 23:00


 


Intake Total   360 ml 0 ml 300 ml 


 


Output Total     5 ml 


 


Balance   360 ml 0 ml 295 ml 


 


      


 


Intake Oral   360 ml 0 ml  


 


Other     300 ml 


 


Output Estimated Blood Loss     5 ml 


 


# Voids  1 3 1  


 


# Bowel Movements   1 1  








Result Diagram:  


10/27/17 0700                                                                  

              10/28/17 0659





Objective Remarks


GENERAL: NAD, A&Ox3


HEAD: Normocephalic. 


NECK: Supple, trachea midline. No lymphadenopathy.


EYES: No scleral icterus. No injection or drainage. 


CARDIOVASCULAR: Regular rate and rhythm without murmurs, gallops, or rubs. 


RESPIRATORY: Breath sounds equal bilaterally. No accessory muscle use.


GASTROINTESTINAL: Abdomen soft, non-tender, nondistended. 


MUSCULOSKELETAL: No cyanosis, or edema. 


SKIN: Warm and dry.  Infected second toe right foot.


NEURO:  No focal neurological deficitis.





A/P


Problem List:  


(1) Toe infection


ICD Code:  L08.9 - Local infection of the skin and subcutaneous tissue, 

unspecified


Status:  Acute


(2) Osteomyelitis of ankle or foot


ICD Code:  M86.9 - Osteomyelitis, unspecified


Status:  Acute


Assessment and Plan





Assessment and Plan


52-year-old male admitted secondary to right second toe infection





Osteomyelitis of ankle or foot


Septic arthritis of interphalangeal joint of toe of right foot


Amputation of second right toe today


Podiatry following


Continue vancomycin


Continue Rocephin


Continue as needed pain treatments


Follow cultures


 


Dehydration


Lactic acidosis


Improved


Continue IV hydration as patient is currently nothing by mouth





Nicotine dependence


NicoDerm





DVT prophylaxis


Amputation today


Start Lovenox tomorrow











Milton Hernandez MD Oct 28, 2017 14:23

## 2017-10-28 NOTE — RADRPT
EXAM DATE/TIME:  10/28/2017 14:40 

 

[facility] COMPARISON:     

FOOT RIGHT COMPLETE ([id number]), October 26, 2017, 17:52.

 

                     

INDICATIONS :     

Status post amputation..

                     

 

MEDICAL HISTORY :     

None.          

 

SURGICAL HISTORY :        

ORIF right ankle.

 

ENCOUNTER:     

Initial                                        

 

ACUITY:     

1 day      

 

PAIN SCORE:     

5/10

 

LOCATION:     

Right  entire foot

 

FINDINGS:     

The patient is status post amputation of the second digit down to the level of the metatarsal. There 
is mild soft tissue swelling and small collection of gas. Degenerative changes again noted in the fir
st metatarsal-phalangeal joint with joint space loss, sclerosis and hypertrophic change.

 

CONCLUSION:     

1. Status post interval amputation of the second digit.

2. Moderate degenerative change involving the first metatarsophalangeal joint.

 

 

 

 Gary Cespedes MD on October 28, 2017 at 15:28           

Board Certified Radiologist.

 This report was verified electronically.

## 2017-10-28 NOTE — PD.OP
__________________________________________________





Operative Report


Date of Surgery:  Oct 28, 2017


Preoperative Diagnosis:  


(1) Osteomyelitis of ankle or foot


Second toe right foot


Postoperative Diagnosis:  


(1) Osteomyelitis of ankle or foot


Second toe right foot


Procedure:


Amputation of second toe at the metatarsal phalangeal joint right foot


Anesthesia:


General inhalation


Surgeon:


Julius Goodrich DPM


Assistant(s):


None


Operation and Findings:


Patient is brought to the operating room placed on the operating table in 

supine position.  A pneumatic ankle cuff was placed around the patient's right 

ankle after adequate warm roll padding.  Patient was given general relation 

anesthesia.  The right foot was prepped and draped in the usual sterile manner.

  After the appropriate timeout was performed the right lower extremity was 

elevated above the operating table for a period of 3 minutes at which time the 

pneumatic ankle cuff was inflated to 250 mmHg.  Total cuff time was 29 minutes.

  Right foot was lowered to the operating table and attention was directed to 

the second toe which is noted to have osteomyelitis at the distal 

interphalangeal joint.  It is also noted to toe is swollen all the way back to 

the metatarsal phalangeal joint.  At this time 2 fishmouth incisions were made 

medial and lateral on the second toe down to the level of the proximal phalanx.

  The toe was freed up and using an oscillating saw the toe was resected at the 

prior to the proximal phalanx and delivered from the wound.  Using sharp 

dissection the remaining proximal phalanx was freed up and disarticulated from 

the f second metatarsal phalangeal joint.  The area is flushed with copious 

amounts of sterile saline.  Subcutaneous tissue was reapproximated and closed 

with 3-0 Vicryl.  Skin edges were reapproximated and closed with 3-0 nylon.  

The incision was anesthetized with 10 cc of 0.5% Marcaine.  Incision was 

dressed with Adaptic 4 x 4's and Taylor.  The pneumatic ankle cuff was deflated 

and the vascular status returned to the remaining toes of the right foot.


Sponge and instrument counts were noted to be correct.


Estimated blood loss was less than 5 cc.


Second toe and proximal phalanx or sent to pathology for gross and micro-.


Patient tolerated the procedures and anesthesia well and left the OR to PACU in 

apparent satisfactory condition with all vital signs stable and the vascular 

status intact to the remaining digits of the right foot











Julius Goodrich DPM Oct 28, 2017 14:32

## 2017-10-29 VITALS
RESPIRATION RATE: 21 BRPM | DIASTOLIC BLOOD PRESSURE: 70 MMHG | SYSTOLIC BLOOD PRESSURE: 139 MMHG | HEART RATE: 77 BPM | TEMPERATURE: 97.6 F | OXYGEN SATURATION: 96 %

## 2017-10-29 VITALS
HEART RATE: 84 BPM | TEMPERATURE: 97.8 F | RESPIRATION RATE: 16 BRPM | SYSTOLIC BLOOD PRESSURE: 147 MMHG | DIASTOLIC BLOOD PRESSURE: 80 MMHG | OXYGEN SATURATION: 99 %

## 2017-10-29 VITALS
TEMPERATURE: 98.4 F | RESPIRATION RATE: 17 BRPM | HEART RATE: 82 BPM | OXYGEN SATURATION: 96 % | SYSTOLIC BLOOD PRESSURE: 146 MMHG | DIASTOLIC BLOOD PRESSURE: 90 MMHG

## 2017-10-29 VITALS
SYSTOLIC BLOOD PRESSURE: 163 MMHG | HEART RATE: 81 BPM | RESPIRATION RATE: 18 BRPM | TEMPERATURE: 97.4 F | OXYGEN SATURATION: 97 % | DIASTOLIC BLOOD PRESSURE: 86 MMHG

## 2017-10-29 VITALS
HEART RATE: 100 BPM | RESPIRATION RATE: 18 BRPM | OXYGEN SATURATION: 99 % | DIASTOLIC BLOOD PRESSURE: 86 MMHG | SYSTOLIC BLOOD PRESSURE: 155 MMHG | TEMPERATURE: 97.6 F

## 2017-10-29 VITALS
TEMPERATURE: 97.7 F | OXYGEN SATURATION: 97 % | SYSTOLIC BLOOD PRESSURE: 150 MMHG | HEART RATE: 77 BPM | RESPIRATION RATE: 21 BRPM | DIASTOLIC BLOOD PRESSURE: 89 MMHG

## 2017-10-29 VITALS — OXYGEN SATURATION: 99 %

## 2017-10-29 LAB
ANION GAP SERPL CALC-SCNC: 7 MEQ/L (ref 5–15)
BUN SERPL-MCNC: 8 MG/DL (ref 7–18)
CHLORIDE SERPL-SCNC: 109 MEQ/L (ref 98–107)
ERYTHROCYTE [DISTWIDTH] IN BLOOD BY AUTOMATED COUNT: 12.4 % (ref 11.6–17.2)
GFR SERPLBLD BASED ON 1.73 SQ M-ARVRAT: 103 ML/MIN (ref 89–?)
HCO3 BLD-SCNC: 25 MEQ/L (ref 21–32)
HCT VFR BLD CALC: 35.8 % (ref 39–51)
MCH RBC QN AUTO: 31.5 PG (ref 27–34)
MCHC RBC AUTO-ENTMCNC: 33.3 % (ref 32–36)
MCV RBC AUTO: 94.6 FL (ref 80–100)
PLATELET # BLD: 227 TH/MM3 (ref 150–450)
POTASSIUM SERPL-SCNC: 4.1 MEQ/L (ref 3.5–5.1)
RBC # BLD AUTO: 3.78 MIL/MM3 (ref 4.5–5.9)
REVIEW FLAG: (no result)
SODIUM SERPL-SCNC: 141 MEQ/L (ref 136–145)
WBC # BLD AUTO: 5.5 TH/MM3 (ref 4–11)

## 2017-10-29 RX ADMIN — Medication SCH ML: at 09:29

## 2017-10-29 RX ADMIN — HYDROMORPHONE HYDROCHLORIDE PRN MG: 1 INJECTION, SOLUTION INTRAMUSCULAR; INTRAVENOUS; SUBCUTANEOUS at 17:31

## 2017-10-29 RX ADMIN — OXYCODONE HYDROCHLORIDE AND ACETAMINOPHEN PRN TAB: 10; 325 TABLET ORAL at 18:41

## 2017-10-29 RX ADMIN — LISINOPRIL SCH MG: 10 TABLET ORAL at 09:30

## 2017-10-29 RX ADMIN — FOLIC ACID SCH MG: 1 TABLET ORAL at 09:30

## 2017-10-29 RX ADMIN — SODIUM CHLORIDE SCH MLS/HR: 900 INJECTION INTRAVENOUS at 03:32

## 2017-10-29 RX ADMIN — MULTIPLE VITAMINS W/ MINERALS TAB SCH TAB: TAB at 09:30

## 2017-10-29 RX ADMIN — Medication SCH MG: at 09:30

## 2017-10-29 RX ADMIN — Medication SCH ML: at 20:05

## 2017-10-29 RX ADMIN — GABAPENTIN SCH MG: 300 CAPSULE ORAL at 11:44

## 2017-10-29 RX ADMIN — OXYCODONE HYDROCHLORIDE AND ACETAMINOPHEN PRN TAB: 10; 325 TABLET ORAL at 01:36

## 2017-10-29 RX ADMIN — CEFEPIME SCH MLS/HR: 1 INJECTION, POWDER, FOR SOLUTION INTRAMUSCULAR; INTRAVENOUS at 09:29

## 2017-10-29 RX ADMIN — HYDROMORPHONE HYDROCHLORIDE PRN MG: 1 INJECTION, SOLUTION INTRAMUSCULAR; INTRAVENOUS; SUBCUTANEOUS at 09:33

## 2017-10-29 RX ADMIN — OXYCODONE HYDROCHLORIDE AND ACETAMINOPHEN PRN TAB: 10; 325 TABLET ORAL at 22:59

## 2017-10-29 RX ADMIN — STANDARDIZED SENNA CONCENTRATE AND DOCUSATE SODIUM SCH TAB: 8.6; 5 TABLET, FILM COATED ORAL at 20:05

## 2017-10-29 RX ADMIN — HEPARIN SODIUM SCH UNITS: 10000 INJECTION, SOLUTION INTRAVENOUS; SUBCUTANEOUS at 09:39

## 2017-10-29 RX ADMIN — SODIUM CHLORIDE SCH MLS/HR: 900 INJECTION INTRAVENOUS at 16:57

## 2017-10-29 RX ADMIN — HYDROMORPHONE HYDROCHLORIDE PRN MG: 1 INJECTION, SOLUTION INTRAMUSCULAR; INTRAVENOUS; SUBCUTANEOUS at 20:54

## 2017-10-29 RX ADMIN — GABAPENTIN SCH MG: 300 CAPSULE ORAL at 16:58

## 2017-10-29 RX ADMIN — STANDARDIZED SENNA CONCENTRATE AND DOCUSATE SODIUM SCH TAB: 8.6; 5 TABLET, FILM COATED ORAL at 09:29

## 2017-10-29 RX ADMIN — HYDROMORPHONE HYDROCHLORIDE PRN MG: 1 INJECTION, SOLUTION INTRAMUSCULAR; INTRAVENOUS; SUBCUTANEOUS at 03:12

## 2017-10-29 RX ADMIN — GABAPENTIN SCH MG: 300 CAPSULE ORAL at 09:30

## 2017-10-29 RX ADMIN — OXYCODONE HYDROCHLORIDE AND ACETAMINOPHEN PRN TAB: 10; 325 TABLET ORAL at 14:15

## 2017-10-29 RX ADMIN — HEPARIN SODIUM SCH UNITS: 10000 INJECTION, SOLUTION INTRAVENOUS; SUBCUTANEOUS at 16:58

## 2017-10-29 RX ADMIN — OXYCODONE HYDROCHLORIDE AND ACETAMINOPHEN PRN TAB: 10; 325 TABLET ORAL at 05:55

## 2017-10-29 RX ADMIN — CEFEPIME SCH MLS/HR: 1 INJECTION, POWDER, FOR SOLUTION INTRAMUSCULAR; INTRAVENOUS at 20:04

## 2017-10-29 RX ADMIN — PHENYTOIN SODIUM SCH MLS/HR: 50 INJECTION INTRAMUSCULAR; INTRAVENOUS at 01:38

## 2017-10-29 RX ADMIN — OXYCODONE HYDROCHLORIDE AND ACETAMINOPHEN PRN TAB: 10; 325 TABLET ORAL at 10:23

## 2017-10-29 RX ADMIN — PHENYTOIN SODIUM SCH MLS/HR: 50 INJECTION INTRAMUSCULAR; INTRAVENOUS at 13:57

## 2017-10-29 NOTE — PD.POD
Subjective


Podiatric Problems


Status post amputation second toe right foot


Pain scale used:  0-10 numeric scale


Pain score:  7


Remarks


52-year-old male presented with osteomyelitis of the second toe right foot.  He 

is one day status post amputation of the second toe.  Patient was sleeping when 

I came into the room.  Patient was awakened complained of severe burning and 

pain in his foot.  Is planning on discharging the patient today.  However, we 

can discharge him in the morning.





Past Med/Surg/Social History


Past Medical History


PFSH Reviewed:  Yes


Cardiovascular:  REPORTS HX OF: Hypertension





Past Surgical History


Musculoskeletal:  REPORTS HX OF: Other musculoskeletal srg





Social History


Smoking Status:  Current Every Day Smoker





Review of Systems


Notes


No changes in his 14 point review of systems exam with the exception of the 

surgery yesterday


Constitutional:  COMPLAINS OF: Pain





Objective


Vital Signs





Vital Signs








  Date Time  Temp Pulse Resp B/P (MAP) Pulse Ox O2 Delivery O2 Flow Rate FiO2


 


10/29/17 08:00 98.4 82 17 146/90 (108) 96   


 


10/29/17 04:00 97.7 77 21 150/89 (109) 97   


 


10/29/17 00:00 97.6 77 21 139/70 (93) 96   


 


10/28/17 20:00 96.2 68 21 158/86 (110) 98   


 


10/28/17 16:00 96.6 64 18 162/95 (117) 100   


 


10/28/17 15:10   20     


 


10/28/17 14:57   15     


 


10/28/17 14:30  63 12 138/82 (100) 100 Nasal Cannula 3 


 


10/28/17 14:20 97.6 64 14 143/80 (101) 100 Nasal Cannula 3 


 


10/28/17 12:00 96.2 61 17 156/97 (116) 99   








Coded Allergies:  


     No Known Allergies (Verified , 10/26/17)


Medications and IVs





Current Medications


Sodium Chloride 1,000 ml @  1,000 mls/hr Q1H IV  Last administered on 10/26/

17at 18:10;  Start 10/26/17 at 17:41;  Stop 10/26/17 at 18:40;  Status DC


Sodium Chloride (NS Flush) 2 ml UNSCH  PRN IV FLUSH FLUSH AFTER USING IV ACCESS

;  Start 10/26/17 at 17:45;  Stop 10/26/17 at 19:57;  Status DC


Sodium Chloride 1,000 ml @  1,000 mls/hr Q1H IV  Last administered on 10/26/

17at 18:31;  Start 10/26/17 at 17:46;  Stop 10/26/17 at 18:45;  Status DC


Sodium Chloride (NS Flush) 2 ml UNSCH  PRN IV FLUSH FLUSH AFTER USING IV ACCESS

;  Start 10/26/17 at 18:00;  Stop 10/26/17 at 19:57;  Status DC


Vancomycin HCl 1250 mg/Sodium Chloride 262.5 ml @  262.5 mls/ hr ONCE  ONCE IV  

Last administered on 10/26/17at 19:38;  Start 10/26/17 at 18:00;  Stop 10/26/17 

at 18:59;  Status DC


Ceftriaxone Sodium 1000 mg/ Sodium Chloride 100 ml @  200 mls/hr ONCE  ONCE IV  

Last administered on 10/26/17at 18:03;  Start 10/26/17 at 18:00;  Stop 10/26/17 

at 18:29;  Status DC


Morphine Sulfate (Morphine Inj) 4 mg ONCE  ONCE IV PUSH  Last administered on 10

/26/17at 18:10;  Start 10/26/17 at 18:15;  Stop 10/26/17 at 18:16;  Status DC


Morphine Sulfate (Morphine Inj) 2 mg ONCE  ONCE IV PUSH  Last administered on 10

/26/17at 18:55;  Start 10/26/17 at 18:45;  Stop 10/26/17 at 18:46;  Status DC


Pharmacy Profile Note 0 ml @ 0 mls/hr UNSCH OTHER ;  Start 10/26/17 at 19:45


Cefepime HCl 1000 mg/Sodium Chloride 100 ml @  200 mls/hr Q12H IV  Last 

administered on 10/29/17at 09:29;  Start 10/27/17 at 09:00


Sodium Chloride 1,000 ml @  100 mls/hr Q10H IV  Last administered on 10/29/17at 

01:38;  Start 10/26/17 at 20:00


Sodium Chloride (NS Flush) 2 ml UNSCH  PRN IV FLUSH FLUSH AFTER USING IV ACCESS

;  Start 10/26/17 at 19:45;  Stop 10/28/17 at 14:33;  Status DC


Sodium Chloride (NS Flush) 2 ml BID IV FLUSH  Last administered on 10/27/17at 21

:22;  Start 10/26/17 at 21:00;  Stop 10/28/17 at 14:33;  Status DC


Heparin Sodium (Porcine) (Heparin Inj) 5,000 units Q8H SQ  Last administered on 

10/28/17at 09:25;  Start 10/27/17 at 09:00


Acetaminophen (Tylenol) 650 mg Q6H  PRN PO FEVER/PAIN SCALE 1 TO 2;  Start 10/26

/17 at 19:45


Acetaminophen/ Hydrocodone Bitart (Norco  5-325 Mg) 1 tab Q4H  PRN PO PAIN 

SCALE 3 TO 5 Last administered on 10/27/17at 02:48;  Start 10/26/17 at 19:45;  

Stop 10/28/17 at 14:36;  Status DC


Morphine Sulfate (Morphine Inj) 2 mg Q3H  PRN IV PUSH Pain 6-10 Last 

administered on 10/27/17at 12:10;  Start 10/26/17 at 19:45;  Stop 10/27/17 at 12

:32;  Status DC


Senna/Docusate Sodium (Estelita-Colace) 1 tab BID PO  Last administered on 10/29/

17at 09:29;  Start 10/26/17 at 21:00


Magnesium Hydroxide (Milk Of Magnesia Liq) 30 ml Q12H  PRN PO Mild constipation

;  Start 10/26/17 at 19:45


Sennosides (Senokot) 17.2 mg Q12H  PRN PO Moderate constipation;  Start 10/26/

17 at 19:45


Bisacodyl (Dulcolax Supp) 10 mg DAILY  PRN RECTAL SEVERE CONSITIPATION;  Start 

10/26/17 at 19:45


Lactulose (Lactulose Liq) 30 ml DAILY  PRN PO SEVERE CONSITIPATION;  Start 10/26

/17 at 19:45


Amlodipine Besylate (Norvasc) 5 mg DAILY PO  Last administered on 10/29/17at 09:

30;  Start 10/27/17 at 09:00


Gabapentin (Neurontin) 300 mg TID PO  Last administered on 10/29/17at 09:30;  

Start 10/27/17 at 09:00


Hydroxyzine Pamoate (Vistaril) 25 mg TID  PRN PO MODERATE TO SEVERE ANXIETY 

Last administered on 10/29/17at 09:30;  Start 10/26/17 at 22:45


Lisinopril (Prinivil) 20 mg DAILY PO  Last administered on 10/29/17at 09:30;  

Start 10/27/17 at 09:00


Fluoxetine HCl (PROzac) 40 mg DAILY PO  Last administered on 10/29/17at 09:30;  

Start 10/27/17 at 09:00


Trazodone HCl (Desyrel) 100 mg HS PO  Last administered on 10/28/17at 21:34;  

Start 10/26/17 at 22:45


Folic Acid (Folate) 1 mg DAILY PO  Last administered on 10/29/17at 09:30;  

Start 10/27/17 at 09:00;  Stop 11/1/17 at 08:59


Thiamine HCl (Vitamin B1) 100 mg DAILY PO  Last administered on 10/29/17at 09:30

;  Start 10/27/17 at 09:00


Multivitamins/ Minerals Therapeutic (Theragran M Tab) 1 tab DAILY PO  Last 

administered on 10/29/17at 09:30;  Start 10/27/17 at 09:00;  Stop 11/1/17 at 08:

59


Flumazenil (Romazicon Inj) 0.2 mg Q1M  PRN IV PUSH SEE LABEL COMMENTS;  Start 10

/26/17 at 22:45


Lorazepam (Ativan) 1 mg Q4H  PRN PO CIWA 8 - 10 Last administered on 10/28/17at 

21:38;  Start 10/26/17 at 22:45


Lorazepam (Ativan Inj) 1 mg Q4H  PRN IV PUSH CIWA 8 - 10;  Start 10/26/17 at 22:

45


Lorazepam (Ativan) 2 mg Q2H  PRN PO CIWA 11-14;  Start 10/26/17 at 22:45


Lorazepam (Ativan Inj) 2 mg Q2H  PRN IV PUSH CIWA 11-14;  Start 10/26/17 at 22:

45


Lorazepam (Ativan Inj) 2 mg Q1H  PRN IV PUSH CIWA 15-20;  Start 10/26/17 at 22:

45


Lorazepam (Ativan Inj) 2 mg Q15M  PRN IV PUSH CIWA > 20;  Start 10/26/17 at 22:

45


Haloperidol Lactate (Haldol Inj) 2 mg Q15M  PRN IM SEE LABEL COMMENTS;  Start 10

/26/17 at 22:45


Morphine Sulfate (Morphine Inj) 2 mg Q4H  PRN IV PUSH breakthrough pain ;  

Start 10/27/17 at 13:00;  Stop 10/28/17 at 14:35;  Status DC


Oxycodone/ Acetaminophen (Percocet  Mg) 1 tab Q4H  PRN PO PAIN 6-10 Last 

administered on 10/28/17at 07:56;  Start 10/27/17 at 13:00;  Stop 10/28/17 at 14

:36;  Status DC


Vancomycin HCl 1500 mg/Sodium Chloride 515 ml @  257.5 mls/ hr Q12H IV  Last 

administered on 10/29/17at 03:32;  Start 10/27/17 at 16:00


Miscellaneous Information SPECIFIC LAB TO BE ROX... ONCE  ONCE .XX  Last 

administered on 10/29/17at 03:30;  Start 10/29/17 at 03:45;  Stop 10/29/17 at 03

:46;  Status DC


Lactated Ringer's 1,000 ml @  30 mls/hr Q24H PRN IV SEE LABEL COMMENTS;  Start 

10/28/17 at 03:30;  Stop 10/31/17 at 03:29


Bupivacaine HCl (Marcaine Pf 0.5% Inj) 30 ml STK-MED ONCE .ROUTE  Last 

administered on 10/28/17at 13:42;  Start 10/28/17 at 12:07;  Stop 10/28/17 at 12

:08;  Status DC


Lidocaine HCl (Xylocaine 1% Inj (50 ml)) 50 ml STK-MED ONCE .ROUTE ;  Start 10/

28/17 at 12:07;  Stop 10/28/17 at 12:08;  Status DC


Sodium Chloride (NS Flush) 2 ml UNSCH  PRN IV FLUSH FLUSH AFTER USING IV ACCESS

;  Start 10/28/17 at 14:30


Sodium Chloride (NS Flush) 2 ml BID IV FLUSH  Last administered on 10/28/17at 21

:40;  Start 10/28/17 at 21:00


Miscellaneous Information (Post-op Orders (for Pharmacy))  STAT  ONCE XX ;  

Start 10/28/17 at 14:30;  Stop 10/28/17 at 14:34;  Status DC


Hydromorphone HCl (Dilaudid Pf Inj) 1 mg Q3H  PRN IV PUSH BREAKTHROUGH PAIN 

Last administered on 10/29/17at 09:33;  Start 10/28/17 at 14:30


Hydromorphone HCl (Dilaudid) 1 mg Q4H  PRN PO PAIN SCALE 3 TO 5;  Start 10/28/

17 at 14:30;  Stop 10/28/17 at 23:00;  Status DC


Hydromorphone HCl (Dilaudid) 2 mg Q4H  PRN PO PAIN SCALE 6 TO 10 Last 

administered on 10/28/17at 21:35;  Start 10/28/17 at 14:30;  Stop 10/28/17 at 23

:00;  Status DC


Naloxone HCl (Narcan Inj) 0.4 mg UNSCH  PRN IV PUSH SEE LABEL COMMENTS;  Start 

10/28/17 at 14:30


Miscellaneous (Pill Splitter) 1 ea UNSCH  PRN OTHER SEE LABEL COMMENTS;  Start 

10/28/17 at 14:45


Miscellaneous Information ALL NURSING DEPARTME... UNSCH  PRN .XX SEE LABEL 

COMMENTS;  Start 10/28/17 at 14:15;  Stop 10/29/17 at 14:14


Morphine Sulfate (*morphine INJ PERIprocedure ONLY) 8 mg STK-MED ONCE .ROUTE  

Last administered on 10/28/17at 14:52;  Start 10/28/17 at 14:52;  Stop 10/28/17 

at 14:53;  Status DC


Morphine Sulfate (*morphine INJ PERIprocedure ONLY) 8 mg STK-MED ONCE .ROUTE  

Last administered on 10/28/17at 15:05;  Start 10/28/17 at 15:05;  Stop 10/28/17 

at 15:06;  Status DC


Oxycodone/ Acetaminophen (Percocet  5-325 Mg) 1 tab Q4H  PRN PO Pain 1-5;  

Start 10/28/17 at 23:00


Oxycodone/ Acetaminophen (Percocet  Mg) 1 tab Q4H  PRN PO pain 6-10 Last 

administered on 10/29/17at 10:23;  Start 10/28/17 at 23:00


Other Results





Laboratory Tests








Test


  10/29/17


03:30


 


White Blood Count 5.5 TH/MM3 


 


Red Blood Count 3.78 MIL/MM3 


 


Hemoglobin 11.9 GM/DL 


 


Hematocrit 35.8 % 


 


Mean Corpuscular Volume 94.6 FL 


 


Mean Corpuscular Hemoglobin 31.5 PG 


 


Mean Corpuscular Hemoglobin


Concent 33.3 % 


 


 


Red Cell Distribution Width 12.4 % 


 


Platelet Count 227 TH/MM3 


 


Mean Platelet Volume 8.0 FL 








Laboratory Tests








Test


  10/28/17


06:59 10/29/17


03:30


 


Creatinine 0.85 MG/DL  0.79 MG/DL 


 


Estimat Glomerular Filtration


Rate 95 ML/MIN 


  103 ML/MIN 


 


 


Blood Urea Nitrogen  8 MG/DL 


 


Random Glucose  107 MG/DL 


 


Calcium Level  8.0 MG/DL 


 


Sodium Level  141 MEQ/L 


 


Potassium Level  4.1 MEQ/L 


 


Chloride Level  109 MEQ/L 


 


Carbon Dioxide Level  25.0 MEQ/L 


 


Anion Gap  7 MEQ/L 








Microbiology








 Date/Time


Source Procedure


Growth Status


 


 


 10/26/17 18:02


Wound Toe Gram Stain - Final Resulted


 


 10/26/17 18:02 Wound Culture - Preliminary


Group B Beta Strep


Staphylococcus Aureus Resulted











Exam-Podiatry


Constitutional


General appearance:  uncomfortable


Nutritional status:  overweight


Orientation:  alert and oriented x3





Dermatological Exam


Skin Temp - Right:  Within Normal Limits


Skin Texture - Right:  Within Normal Limits


Skin Elasticity - Right:  Within Normal Limits


Skin Tugor - Right:  Within Normal Limits


Hair Growth - Right:  Within Normal Limits


Pigmentation - Right:  Within Normal Limits


Skin Temp - Left:  Within Normal Limits


Skin Texture - Left:  Within Normal Limits


Skin Elasticity - Left:  Within Normal Limits


Skin Tugor - Left:  Within Normal Limits


Hair Growth - Left:  Within Normal Limits


Pigmentation - Left:  Within Normal Limits


Other: Scars, Surgery,Injury


Surgical dressing is dry and intact.  Some dry blood noted on the bandage.  

Patient did not have shoe on his foot.





Vascular/Lymphatic Exam


R Dorsails Pedis:  Palpable


L Dorsails Pedis:  Palpable


R Posterior Tibial:  Palpable


L Posterior Tibial:  Palpable





Neurologic Exam


Details


No neurological deficits seen





Musculoskeletal Exam


Details


Amputation second toe right foot





Muscle Strength


Dorsiflexion (Right):  Normal


Plantarflexion (Right):  Normal


Inversion (Right):  Normal


Eversion (Right):  Normal


Digital (Right):  Normal


Dorsiflexion (Left):  Normal


Plantarflexion (Left):  Normal


Inversion (Left):  Normal


Eversion (Left):  Normal


Digital (Left):  Normal





Foot Range of Motion


Dorsiflexion (Right):  Normal


 Plantarflexion (Right):  Normal


Inversion (Right):  Normal


Eversion (Right):  Normal


Digital (Right):  Normal


Dorsiflexion (Left):  Normal


Plantarflexion (Left):  Normal


Inversion (Left):  Normal


Eversion (Left):  Normal


Digital (Left):  Normal





Assessment & Plan


Diagnosis:  


(1) Osteomyelitis of ankle or foot


ICD Codes:  M86.9 - Osteomyelitis, unspecified


Status:  Resolved


A/P


PLAN:





Patient can be discharged tomorrow with oral antibiotics and pain med.  He is 

to follow-up in my office on Wednesday.  No baths or showers.  He is to wear 

the postop shoe at all times.  PT was ordered.


Discussed with Julius Wall DPM Oct 29, 2017 10:40

## 2017-10-29 NOTE — HHI.PR
Subjective


Remarks


Patient reports uncontrolled pain.  He reports bleeding.  He requests one more 

day of care inpatient.





Objective





Vital Signs








  Date Time  Temp Pulse Resp B/P (MAP) Pulse Ox O2 Delivery O2 Flow Rate FiO2


 


10/29/17 12:00 97.8 84 16 147/80 (102) 99   


 


10/29/17 08:00 98.4 82 17 146/90 (108) 96   


 


10/29/17 04:00 97.7 77 21 150/89 (109) 97   


 


10/29/17 00:00 97.6 77 21 139/70 (93) 96   


 


10/28/17 20:00 96.2 68 21 158/86 (110) 98   


 


10/28/17 16:00 96.6 64 18 162/95 (117) 100   


 


10/28/17 15:10   20     


 


10/28/17 14:57   15     


 


10/28/17 14:30  63 12 138/82 (100) 100 Nasal Cannula 3 


 


10/28/17 14:20 97.6 64 14 143/80 (101) 100 Nasal Cannula 3 














I/O      


 


 10/28/17 10/28/17 10/28/17 10/29/17 10/29/17 10/29/17





 07:00 15:00 23:00 07:00 15:00 23:00


 


Intake Total 515 ml 300 ml 3139 ml 616 ml 515 ml 


 


Output Total  5 ml 1500 ml   


 


Balance 515 ml 295 ml 1639 ml 616 ml 515 ml 


 


      


 


Intake Oral 0 ml  1800 ml 240 ml  


 


IV Total 515 ml  1339 ml 376 ml 515 ml 


 


Other  300 ml    


 


Output Urine Total   1500 ml   


 


Estimated Blood Loss  5 ml    


 


# Voids 1  2 2  


 


# Bowel Movements 1  0   








Result Diagram:  


10/29/17 0330                                                                  

              10/29/17 0330





Objective Remarks


GENERAL: NAD, A&Ox3


HEAD: Normocephalic. 


NECK: Supple, trachea midline. No lymphadenopathy.


EYES: No scleral icterus. No injection or drainage. 


CARDIOVASCULAR: Regular rate and rhythm without murmurs, gallops, or rubs. 


RESPIRATORY: Breath sounds equal bilaterally. No accessory muscle use.


GASTROINTESTINAL: Abdomen soft, non-tender, nondistended. 


MUSCULOSKELETAL: No cyanosis, or edema. 


SKIN: Warm and dry.  Infected second toe right foot.


NEURO:  No focal neurological deficitis.





A/P


Problem List:  


(1) Toe infection


ICD Code:  L08.9 - Local infection of the skin and subcutaneous tissue, 

unspecified


Status:  Acute


(2) Osteomyelitis of ankle or foot


ICD Code:  M86.9 - Osteomyelitis, unspecified


Status:  Resolved


Assessment and Plan





Assessment and Plan


52-year-old male admitted secondary to right second toe infection.  Continue 

pain treatment and monitor for pain control with by mouth treatments.  Physical 

therapy evaluation prior to discharge.  Antibiotic selection of discharge will 

be determined by final culture.





Osteomyelitis of ankle or foot


Septic arthritis of interphalangeal joint of toe of right foot


Amputation of second right toe today


Podiatry following


Continue vancomycin


Continue Rocephin


Continue as needed pain treatments


Follow cultures


 


Dehydration


Lactic acidosis


Improved


Continue IV hydration as patient is currently nothing by mouth





Nicotine dependence


NicoDerm





DVT prophylaxis


Amputation today


Start Lovenox tomorrow











Milton Hernandez MD Oct 29, 2017 12:44

## 2017-10-30 VITALS
OXYGEN SATURATION: 96 % | TEMPERATURE: 97.4 F | HEART RATE: 81 BPM | SYSTOLIC BLOOD PRESSURE: 139 MMHG | RESPIRATION RATE: 18 BRPM | DIASTOLIC BLOOD PRESSURE: 81 MMHG

## 2017-10-30 VITALS
DIASTOLIC BLOOD PRESSURE: 90 MMHG | TEMPERATURE: 96.4 F | HEART RATE: 83 BPM | SYSTOLIC BLOOD PRESSURE: 151 MMHG | OXYGEN SATURATION: 97 % | RESPIRATION RATE: 19 BRPM

## 2017-10-30 VITALS
DIASTOLIC BLOOD PRESSURE: 89 MMHG | RESPIRATION RATE: 18 BRPM | TEMPERATURE: 96.1 F | SYSTOLIC BLOOD PRESSURE: 153 MMHG | HEART RATE: 89 BPM | OXYGEN SATURATION: 98 %

## 2017-10-30 LAB — GFR SERPLBLD BASED ON 1.73 SQ M-ARVRAT: 102 ML/MIN (ref 89–?)

## 2017-10-30 RX ADMIN — OXYCODONE HYDROCHLORIDE AND ACETAMINOPHEN PRN TAB: 10; 325 TABLET ORAL at 09:27

## 2017-10-30 RX ADMIN — HEPARIN SODIUM SCH UNITS: 10000 INJECTION, SOLUTION INTRAVENOUS; SUBCUTANEOUS at 00:12

## 2017-10-30 RX ADMIN — HYDROMORPHONE HYDROCHLORIDE PRN MG: 1 INJECTION, SOLUTION INTRAMUSCULAR; INTRAVENOUS; SUBCUTANEOUS at 00:06

## 2017-10-30 RX ADMIN — HEPARIN SODIUM SCH UNITS: 10000 INJECTION, SOLUTION INTRAVENOUS; SUBCUTANEOUS at 08:11

## 2017-10-30 RX ADMIN — Medication SCH ML: at 08:12

## 2017-10-30 RX ADMIN — LISINOPRIL SCH MG: 10 TABLET ORAL at 08:10

## 2017-10-30 RX ADMIN — HYDROMORPHONE HYDROCHLORIDE PRN MG: 1 INJECTION, SOLUTION INTRAMUSCULAR; INTRAVENOUS; SUBCUTANEOUS at 11:32

## 2017-10-30 RX ADMIN — HYDROMORPHONE HYDROCHLORIDE PRN MG: 1 INJECTION, SOLUTION INTRAMUSCULAR; INTRAVENOUS; SUBCUTANEOUS at 08:08

## 2017-10-30 RX ADMIN — PHENYTOIN SODIUM SCH MLS/HR: 50 INJECTION INTRAMUSCULAR; INTRAVENOUS at 04:00

## 2017-10-30 RX ADMIN — STANDARDIZED SENNA CONCENTRATE AND DOCUSATE SODIUM SCH TAB: 8.6; 5 TABLET, FILM COATED ORAL at 08:10

## 2017-10-30 RX ADMIN — GABAPENTIN SCH MG: 300 CAPSULE ORAL at 08:10

## 2017-10-30 RX ADMIN — OXYCODONE HYDROCHLORIDE AND ACETAMINOPHEN PRN TAB: 10; 325 TABLET ORAL at 13:41

## 2017-10-30 RX ADMIN — Medication SCH MG: at 08:10

## 2017-10-30 RX ADMIN — SODIUM CHLORIDE SCH MLS/HR: 900 INJECTION INTRAVENOUS at 04:28

## 2017-10-30 RX ADMIN — MULTIPLE VITAMINS W/ MINERALS TAB SCH TAB: TAB at 08:10

## 2017-10-30 RX ADMIN — CEFEPIME SCH MLS/HR: 1 INJECTION, POWDER, FOR SOLUTION INTRAMUSCULAR; INTRAVENOUS at 08:10

## 2017-10-30 RX ADMIN — FOLIC ACID SCH MG: 1 TABLET ORAL at 08:10

## 2017-10-30 RX ADMIN — OXYCODONE HYDROCHLORIDE AND ACETAMINOPHEN PRN TAB: 10; 325 TABLET ORAL at 04:28

## 2017-10-30 RX ADMIN — GABAPENTIN SCH MG: 300 CAPSULE ORAL at 13:11

## 2017-10-30 NOTE — HHI.DS
__________________________________________________





Discharge Summary


Admission Date


Oct 27, 2017 at 11:24


Discharge Date:  Oct 30, 2017


Admitting Diagnosis








(1) Toe infection


ICD Code:  L08.9 - Local infection of the skin and subcutaneous tissue, 

unspecified


Status:  Acute


(2) Tobacco abuse


ICD Code:  Z72.0 - Tobacco use


Status:  Acute


(3) Dehydration


ICD Code:  E86.0 - Dehydration


(4) Lactic acidosis


ICD Code:  E87.2 - Acidosis


Procedures


See below


Brief History - From Admission


This is a 52-year-old male with PMH of HTN, Anxiety and Polysubstance Abuse was 

into the ER with complaints of right foot toe swelling and redness x2 wks.  

Reports worsening symptoms in the last 1-2 days.  Denies injury/trauma.  No 

fever, chill, nausea, vomiting or diarrhea.  On arrival, /74, , O2 

sat 99% on RA, Temp 99.0.  CBC unremarkable.  Chemistry negative except for GFR 

71.  Lactic Acid 2.2.  INR 1.0.  Foot X-ray with abnormal appearance of PIP 

joint of second digit with diffuse soft tissue swelling, bony fragmentation and 

narrowing of the joint space.  S/p Wound Culture, Vanc/Rocephin in ER.  

Podiatry consulted by ER physician.


CBC/BMP:  


10/29/17 0330                                                                  

              10/30/17 0509





Significant Findings





Laboratory Tests








Test


  10/27/17


13:40 10/28/17


06:59 10/29/17


03:30 10/30/17


05:09


 


Red Blood Count


  


  


  3.78 MIL/MM3


(4.50-5.90) 


 


 


Hemoglobin


  


  


  11.9 GM/DL


(13.0-17.0) 


 


 


Hematocrit


  


  


  35.8 %


(39.0-51.0) 


 


 


Random Glucose


  


  


  107 MG/DL


() 


 


 


Calcium Level


  


  


  8.0 MG/DL


(8.5-10.1) 


 


 


Chloride Level


  


  


  109 MEQ/L


() 


 


 


Vancomycin Level Trough


  


  


  17.7 MCG/ML


(5.0-10.0) 


 








PE at Discharge


GENERAL: Middle-aged male in no acute distress.


HEENT: PERRLA, EOMI. No scleral icterus or conjunctival pallor. No lid lag or 

facial droop.  


CARDIOVASCULAR: Regular rate and rhythm.  No obvious murmurs to auscultation. 

No chest tenderness to palpation. 


RESPIRATORY: No obvious rhonchi or wheezing. Clear to auscultation. Breath 

sounds equal bilaterally. 


GASTROINTESTINAL: Abdomen soft, non-tender, nondistended. BS normal. 


MUSCULOSKELETAL: Extremities without clubbing, cyanosis, or edema. No obvious 

deformities. Right foot, 2nd toe w/ erythema/edam, decreased ROM due to pain/

swelling, open wound w/ purulent drainage. 


NEUROLOGICAL: Awake, alert and oriented x4. No focal neurologic deficits. 

Moving both upper and lower extremities spontaneously.


Hospital Course


For osteomyelitis of the toe status post amputation podiatry consulted patient 

was on Vanco on Rocephin, he also had leg acidosis and hydration which is 

improved with iv hydration.  Clear by ID and podiatry for discharge on short-

term on antibiotic


Pt Condition on Discharge:  Good


Discharge Disposition:  Discharge Home


Discharge Time:  <= 30 minutes


Discharge Instructions


DIET: Follow Instructions for:  Heart Healthy Diet, Diabetic Diet


Activities you can perform:  See Additionl Instruction


Other Activity Instructions:  


per podiatry recs


Follow up Referrals:  


PCP Follow-up - 1 Week


Podiatry - 11/01/17 with Julius Goodrich DPM





New Medications:  


Clindamycin (Clindamycin) 300 Mg Cap


300 MG PO Q6H for Infection for 10 Days, #40 CAP 0 Refills





Lactobacillus Acidophilus (Lactinex) 1 Chew


1 TAB CHEW TID for Nutritional Supplement for 30 Days, TAB 0 Refills





Folic Acid (Folic Acid) 1 Mg Tablet


1 MG PO DAILY for ., #30 TAB





Oxycodone-Acetaminophen (Oxycodone-Acetaminophen) 5-325 mg Tab


1 TAB PO Q4H PRN for pain, #10 TAB





Thiamine HCl (Gnp Vitamin B-1) 100 Mg Tab


100 MG PO DAILY for . for 30 Days, #30 TAB





 


Continued Medications:  


Amlodipine (Norvasc) 5 Mg Tab


5 MG PO DAILY for 30 Days, TAB





Fluoxetine (Prozac) 40 Mg Cap


40 MG PO DAILY, #30 CAP 0 Refills





Gabapentin (Gabapentin) 300 Mg Cap


300 MG PO TID, #90 CAP 0 Refills





Hydroxyzine Pamoate (Vistaril) 25 Mg Cap


25 MG PO TID PRN for MODERATE TO SEVERE ANXIETY, CAP 0 Refills





Lisinopril (Lisinopril) 10 Mg Tab


20 MG PO DAILY for 30 Days, TAB





Trazodone (Trazodone) 100 Mg Tablet


100 MG PO HS for Control Depression, #30 TAB 0 Refills

















Adolfo Veronica MD Oct 30, 2017 13:30

## 2017-12-26 ENCOUNTER — HOSPITAL ENCOUNTER (EMERGENCY)
Dept: HOSPITAL 17 - NEPK | Age: 52
Discharge: HOME | End: 2017-12-26
Payer: SELF-PAY

## 2017-12-26 VITALS
SYSTOLIC BLOOD PRESSURE: 131 MMHG | OXYGEN SATURATION: 99 % | DIASTOLIC BLOOD PRESSURE: 86 MMHG | HEART RATE: 97 BPM | TEMPERATURE: 98.8 F | RESPIRATION RATE: 18 BRPM

## 2017-12-26 DIAGNOSIS — I10: ICD-10-CM

## 2017-12-26 DIAGNOSIS — F17.200: ICD-10-CM

## 2017-12-26 DIAGNOSIS — L03.211: Primary | ICD-10-CM

## 2017-12-26 PROCEDURE — 96372 THER/PROPH/DIAG INJ SC/IM: CPT

## 2017-12-26 PROCEDURE — 99284 EMERGENCY DEPT VISIT MOD MDM: CPT

## 2017-12-26 NOTE — PD
HPI


Chief Complaint:  Skin Problem


Time Seen by Provider:  14:43


Travel History


International Travel<30 days:  No


Contact w/Intl Traveler<30days:  No


Traveled to known affect area:  No





History of Present Illness


HPI


Patient comes in complaining of a bump on his right cheek that began 2 days 

ago.  Patient states that it drains some yesterday and seemed to decrease the 

size but he continues to have a soreness in his right cheek from this.  Denies 

any radiation of the pain.  Pain is worse with palpation.  Patient reports 

subjective fevers yesterday that have since resolved.  Patient denies doing 

anything for this states that it drained on its own.  Patient denies anything 

like this in the past.  Denies any known injury.  Denies any dental pain, chest 

pain, shortness of breath, neck pain, difficulty swallowing, headache, numbness 

or tingling anywhere, or loss change in bowel or bladder.





PFSH


Past Medical History


Hx Anticoagulant Therapy:  No


Blood Disorders:  No


Anxiety:  Yes


Cancer:  No


Cardiovascular Problems:  Yes (HTN)


Chemotherapy:  No


Cerebrovascular Accident:  No


Diabetes:  No


Diminished Hearing:  No


Endocrine:  No


Gastrointestinal Disorders:  No


Genitourinary:  No


Hypertension:  Yes


Immune Disorder:  No


Implanted Vascular Access Dvce:  No


Musculoskeletal:  No


Neurologic:  No


Psychiatric:  No


Reproductive:  No


Respiratory:  No


Radiation Therapy:  No





Past Surgical History


Eye Surgery:  Yes (LEFT EYE TEAR DUCT)


Other Surgery:  Yes





Social History


Alcohol Use:  Yes (FEW BEERS DAILY)


Tobacco Use:  Yes (1/4 PPD)


Substance Use:  Yes (MARAJUANA )





Allergies-Medications


(Allergen,Severity, Reaction):  


Coded Allergies:  


     No Known Allergies (Verified  Allergy, Unknown, 11/20/17)


Reported Meds & Prescriptions





Reported Meds & Active Scripts


Active


Keflex (Cephalexin) 500 Mg Cap 500 Mg PO Q8H


Bactrim DS (Sulfamethoxazole-Trimethoprim) 800-160 Mg Tab 1 Tab PO BID


Tramadol (Tramadol HCl) 50 Mg Tab 50 Mg PO Q8H PRN


Lactinex (Lactobacillus Acidophilus) 1 Chew 1 Tab CHEW TID 30 Days


Clindamycin (Clindamycin HCl) 300 Mg Cap 300 Mg PO Q6H 10 Days


Gnp Vitamin B-1 (Thiamine HCl) 100 Mg Tab 100 Mg PO DAILY 30 Days


Folic Acid 1 Mg Tablet 1 Mg PO DAILY


Lisinopril 10 Mg Tab 20 Mg PO DAILY 30 Days


Norvasc (Amlodipine Besylate) 5 Mg Tab 5 Mg PO DAILY 30 Days


Reported


Trazodone (Trazodone HCl) 100 Mg Tablet 100 Mg PO HS


Gabapentin 300 Mg Cap 300 Mg PO TID


Vistaril (Hydroxyzine Pamoate) 25 Mg Cap 25 Mg PO TID PRN


Prozac (Fluoxetine HCl) 40 Mg Cap 40 Mg PO DAILY








Review of Systems


Except as stated in HPI:  all other systems reviewed are Neg





Physical Exam


Narrative


GENERAL: Well-developed, overly nourished, in no acute distress, and non-ill 

appearing.


SKIN: Focused skin assessment warm and dry.


HEAD: Atraumatic. Normocephalic. 


EYES: Pupils equal and round. EOMI. No scleral icterus. No injection or 

drainage. 


ENT: No nasal bleeding or discharge.  Mucous membranes pink and moist.  Poor 

dentition with no visible or palpable abscess.  Uvula is midline.  Patient has 

a area of induration right cheek approximately 2 cm in greatest diameter that 

is tender to palpation.  There is no crepitus, fluctuation, or drainage.


NECK: Trachea midline. No cervical lymphadenopathy. Supple.  No nuclear 

rigidity.


RESPIRATORY: No accessory muscle use.  No respiratory distress. 


MUSCULOSKELETAL: No obvious deformities. No clubbing.  No cyanosis.  No edema.  

Full range of motion.


NEUROLOGICAL: Awake and alert. No obvious cranial nerve deficits.  Motor 

grossly within normal limits. Normal speech.


PSYCHIATRIC: Appropriate mood and affect; insight and judgment normal.





Data


Data


Last Documented VS





Vital Signs








  Date Time  Temp Pulse Resp B/P (MAP) Pulse Ox O2 Delivery O2 Flow Rate FiO2


 


12/26/17 15:19        


 


12/26/17 14:40 98.8 97 18  99 Room Air  








Orders





 Orders


Clindamycin Inj (Cleocin Inj) (12/26/17 15:00)


Ed Discharge Order (12/26/17 14:50)


Ketorolac Inj (Toradol Inj) (12/26/17 15:00)








[facility]


Medical Decision Making


Medical Screen Exam Complete:  Yes


Emergency Medical Condition:  Yes


Differential Diagnosis


Abscess, cellulitis, folliculitis, gangrene,


Narrative Course


The patient has no evidence of obvious abscess at this time. The patient will 

be discharged on antibiotics for cellulitis with possible early/immature 

abscess. Clinical suspicion, diagnosis and care management was discussed. The 

patient was given signs and symptoms warnings for worsening infection, such as 

spreading of redness, increasing pain, and/or swelling, associated heat, pus or 

fever and instructed to return immediately if these signs or symptoms worsen. 

The patient is to return in 2 days for recheck for maturity. Sooner if worsens 

or as needed. The patient agrees with plan.





Patient in no obvious distress upon re-evaluation. Patient was asked if they 

wanted to speak to my attending, which the patient did not wish to do at this 

time.  Any questions/concerns in reference to patient diagnosis/condition 

discussed and clarified prior to patient's discharge. Reinforced sheer 

importance of close follow up with patient's primary physician or primary care 

clinic. Instructed patient to return to ED immediately, if symptoms return/

worsen. Patient showed understanding of above instructions.  Further 

instructions and recommendations were detailed in discharge paperwork.  Patient 

ambulated without difficulty out of ED at discharge.





Diagnosis





 Primary Impression:  


 Cellulitis


 Qualified Codes:  L03.211 - Cellulitis of face


Referrals:  


[facility]


Patient Instructions:  Cellulitis (ED), General Instructions





***Additional Instructions:  


Follow-up with your primary care physician or return here in 2 days for 

recheck.  Take all medication as prescribed.  Return to the emergency 

department if symptoms get worse.


***Med/Other Pt SpecificInfo:  Prescription(s) given


Scripts


Cephalexin (Keflex) 500 Mg Cap


500 MG PO Q8H for Infection, #30 CAP 0 Refills


   Prov: Heriberto Eric MD         12/26/17 


Sulfamethoxazole-Trimethoprim (Bactrim DS) 800-160 Mg Tab


1 TAB PO BID for Infection, #20 TAB 0 Refills


   Prov: Heriberto Eric MD         12/26/17


Disposition:  01 DISCHARGE HOME


Condition:  Stable











Nadir Perez Dec 26, 2017 14:54

## 2018-02-23 ENCOUNTER — HOSPITAL ENCOUNTER (EMERGENCY)
Dept: HOSPITAL 17 - NED | Age: 53
Discharge: LEFT BEFORE DISCHARGE | End: 2018-02-23
Payer: SELF-PAY

## 2018-02-23 VITALS — BODY MASS INDEX: 27.23 KG/M2 | WEIGHT: 179.68 LBS | HEIGHT: 68 IN

## 2018-02-23 VITALS
DIASTOLIC BLOOD PRESSURE: 69 MMHG | SYSTOLIC BLOOD PRESSURE: 147 MMHG | TEMPERATURE: 99.1 F | HEART RATE: 96 BPM | RESPIRATION RATE: 18 BRPM | OXYGEN SATURATION: 100 %

## 2018-02-23 DIAGNOSIS — Z53.21: ICD-10-CM

## 2018-02-23 DIAGNOSIS — K46.9: Primary | ICD-10-CM

## 2018-02-23 PROCEDURE — 99281 EMR DPT VST MAYX REQ PHY/QHP: CPT

## 2018-02-24 ENCOUNTER — HOSPITAL ENCOUNTER (EMERGENCY)
Dept: HOSPITAL 17 - NEPD | Age: 53
Discharge: LEFT BEFORE DISCHARGE | End: 2018-02-24
Payer: SELF-PAY

## 2018-02-24 VITALS
RESPIRATION RATE: 16 BRPM | OXYGEN SATURATION: 100 % | HEART RATE: 99 BPM | TEMPERATURE: 99 F | SYSTOLIC BLOOD PRESSURE: 133 MMHG | DIASTOLIC BLOOD PRESSURE: 78 MMHG

## 2018-02-24 VITALS — BODY MASS INDEX: 27.23 KG/M2 | WEIGHT: 179.68 LBS | HEIGHT: 68 IN

## 2018-02-24 DIAGNOSIS — I10: ICD-10-CM

## 2018-02-24 DIAGNOSIS — N50.812: Primary | ICD-10-CM

## 2018-02-24 DIAGNOSIS — F17.200: ICD-10-CM

## 2018-02-24 PROCEDURE — 99284 EMERGENCY DEPT VISIT MOD MDM: CPT

## 2018-02-24 PROCEDURE — 76870 US EXAM SCROTUM: CPT

## 2018-02-24 PROCEDURE — 93975 VASCULAR STUDY: CPT

## 2018-02-24 PROCEDURE — 96372 THER/PROPH/DIAG INJ SC/IM: CPT

## 2018-02-24 NOTE — RADRPT
EXAM DATE/TIME:  02/24/2018 15:18 

 

[facility] COMPARISON:     

No previous studies available for comparison.

        

 

 

INDICATIONS :     

Testicular pain.

                     

 

MEDICAL HISTORY :        

Hypertension.  Anxiety. Claustrophobia. MRSA.

 

SURGICAL HISTORY :      

Left eye tear duct surgery. 

 

ENCOUNTER:      

Initial

 

ACUITY:      

3 days

 

PAIN SCORE:      

4/10

 

LOCATION:      

Bilateral  testicles. 

                     

 

MEASUREMENTS:     

 

RIGHT TESTICLE:        

4.8 x 3.5 x 3.1cm     

 

LEFT TESTICLE:        

4.3 x 3.8 x 3.1cm     

 

FINDINGS:     

 

RIGHT TESTICLE:     

Homogeneous echotexture without intra or extratesticular mass.  Blood flow is symmetric and within no
rmal limits.  No varicocele.  Epididymis is within normal limits.  Small to moderate hydrocele

 

LEFT TESTICLE:     

Homogeneous echotexture without intra or extratesticular mass.  Blood flow is symmetric and within no
rmal limits.  No varicocele.  Epididymis is within normal limits.  Small to moderate hydrocele

 

SCROTUM:     

Within normal limits.  

 

CONCLUSION:     

1. Unremarkable study of testicles.

2. Bilateral small-to-moderate hydroceles.

 

 

 

 Felix Guidry MD on February 24, 2018 at 15:53           

Board Certified Radiologist.

 This report was verified electronically.

## 2018-02-24 NOTE — PD
HPI


Chief Complaint:  Pain: Acute or Chronic


Time Seen by Provider:  14:51


Travel History


International Travel<30 days:  No


Contact w/Intl Traveler<30days:  No


Traveled to known affect area:  No





History of Present Illness


HPI


53-year-old male presents to emergency department with complaint of pain to his 

left groin area just above his left testicle that comes and goes for the past 

year.  Says it is worse today.  Reports left testicular pain.  Says he has been 

told that he has a hernia by [facility], but then has come here and says 

that he does not have a hernia.  So it is unknown if he has a hernia or not.  

He denies fever, vomiting, abdominal pain.  Denies testicular swelling.  Denies 

dysuria or difficulty with stool.  Has not taken any medications or tried any 

treatments to alleviate his symptoms.  Rates pain 7/10.  Describes it as a 

throbbing sensation.  Is requesting an injection of Toradol.  Says he has been 

here before for this pain and was given Toradol which helped his pain.  No 

known aggravating factors.  No primary care provider.  No allergies.  History 

of hypertension.  Has no other medical complaints.  No other modifying factors 

or associated signs and symptoms.





PFSH


Past Medical History


Hx Anticoagulant Therapy:  No


Blood Disorders:  No


Anxiety:  Yes


Cancer:  No


Cardiovascular Problems:  Yes (HTN)


Chemotherapy:  No


Cerebrovascular Accident:  No


Diabetes:  No


Diminished Hearing:  No


Endocrine:  No


Gastrointestinal Disorders:  No


Genitourinary:  No


Hypertension:  Yes


Immune Disorder:  No


Implanted Vascular Access Dvce:  No


Musculoskeletal:  No


Neurologic:  No


Psychiatric:  No


Reproductive:  No


Respiratory:  No


Radiation Therapy:  No





Past Surgical History


Eye Surgery:  Yes (LEFT EYE TEAR DUCT)


Other Surgery:  Yes





Social History


Alcohol Use:  Yes (FEW BEERS DAILY)


Tobacco Use:  Yes (1/4 PPD)


Substance Use:  No (hx of drug use)





Allergies-Medications


(Allergen,Severity, Reaction):  


Coded Allergies:  


     No Known Allergies (Verified  Allergy, Unknown, 2/24/18)


Reported Meds & Prescriptions





Reported Meds & Active Scripts


Active


Lisinopril 10 Mg Tab 20 Mg PO DAILY 30 Days


Reported


Trazodone (Trazodone HCl) 100 Mg Tablet 100 Mg PO HS


Gabapentin 300 Mg Cap 300 Mg PO TID


Vistaril (Hydroxyzine Pamoate) 25 Mg Cap 25 Mg PO TID PRN


Prozac (Fluoxetine HCl) 40 Mg Cap 40 Mg PO DAILY








Review of Systems


Except as stated in HPI:  all other systems reviewed are Neg





Physical Exam


Narrative


GENERAL: Well-nourished, well-developed male patient, in no acute distress


SKIN: Warm and dry.


HEAD: Atraumatic. Normocephalic. 


EYES: Pupils equal and round.


ENT: Mucosa pink and moist. 


NECK: Trachea midline.  No lymphadenopathy.  


CARDIOVASCULAR: Regular rate.


RESPIRATORY: No accessory muscle use. 


GASTROINTESTINAL: Abdomen soft and nondisteneded; without tenderness on 

palpation.  Hepatic and splenic margins not palpable.  Bowel sounds are active 

4 quadrants.  


GENITOURINARY:  Exam done in the presence of a nurse.  Circumcised. Testes 

descended bilaterally without evidence of rotation; left testicle with 

tenderness on palpation; no palpable or visualized hernia in this standing or 

sitting position.  No lesions or erythema.  No urethral discharge.


MUSCULOSKELETAL: No obvious deformities. No clubbing.  No cyanosis.  No edema.


BACK: No CVA tenderness.


NEUROLOGICAL: Awake and alert.  Oriented 3.  No obvious cranial nerve 

deficits.  Motor grossly within normal limits. Normal speech.  Moves all 

extremities.  5/5 strength to all extremities.


PSYCHIATRIC: Appropriate mood and affect; insight and judgment normal.





Data


Data


Last Documented VS





Vital Signs








  Date Time  Temp Pulse Resp B/P (MAP) Pulse Ox O2 Delivery O2 Flow Rate FiO2


 


2/24/18 15:45        


 


2/24/18 14:14 99.0 99 16  100 Room Air  








Orders





 Orders


Ketorolac Inj (Toradol Inj) (2/24/18 15:15)


Us Testicles W Doppler (2/24/18 )








[facility]


Medical Decision Making


Medical Screen Exam Complete:  Yes


Emergency Medical Condition:  Yes


Medical Record Reviewed:  Yes


Differential Diagnosis


Inguinal hernia, testicular torsion, epididymitis


Narrative Course


53-year-old male with pain to his left groin area.  He has left testicular pain 

on exam.  He has apparently been seen for this pain multiple times in the past.

  I reviewed his medical record and did not see any imaging of the testicles.  

Testicular ultrasound ordered to rule out acute process.  Toradol ordered.  AMA

: The risks of leaving against medical advice without further evaluation 

treatment were discussed with the patient.  These risks include cardiac 

dysfunction, cardiac dysrhythmia, possible heart attack, possible stroke or 

death.  The patient indicated understanding of these risks and appeared to have 

the capacity to make this decision.





Diagnosis





 Primary Impression:  


 Left against medical advice


Disposition:  07 AGAINST MEDICAL ADVICE











Geno Rhoades Feb 24, 2018 15:14

## 2018-04-23 ENCOUNTER — HOSPITAL ENCOUNTER (EMERGENCY)
Dept: HOSPITAL 17 - NEPK | Age: 53
Discharge: HOME | End: 2018-04-23
Payer: SELF-PAY

## 2018-04-23 VITALS
SYSTOLIC BLOOD PRESSURE: 159 MMHG | RESPIRATION RATE: 16 BRPM | HEART RATE: 97 BPM | OXYGEN SATURATION: 100 % | TEMPERATURE: 98.4 F | DIASTOLIC BLOOD PRESSURE: 80 MMHG

## 2018-04-23 VITALS — BODY MASS INDEX: 29.07 KG/M2 | HEIGHT: 68 IN | WEIGHT: 191.8 LBS

## 2018-04-23 VITALS — RESPIRATION RATE: 20 BRPM

## 2018-04-23 DIAGNOSIS — L02.612: ICD-10-CM

## 2018-04-23 DIAGNOSIS — S91.332A: Primary | ICD-10-CM

## 2018-04-23 DIAGNOSIS — Z79.899: ICD-10-CM
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
## 2018-04-23 DIAGNOSIS — I10: ICD-10-CM

## 2018-04-23 DIAGNOSIS — F41.9: ICD-10-CM

## 2018-04-23 DIAGNOSIS — F17.210: ICD-10-CM

## 2018-04-23 PROCEDURE — 10060 I&D ABSCESS SIMPLE/SINGLE: CPT

## 2018-04-23 PROCEDURE — 99283 EMERGENCY DEPT VISIT LOW MDM: CPT

## 2018-04-23 PROCEDURE — 96372 THER/PROPH/DIAG INJ SC/IM: CPT

## 2018-04-23 NOTE — PD
HPI


Chief Complaint:  Pain: Acute or Chronic


Time Seen by Provider:  12:11


Travel History


International Travel<30 days:  No


Contact w/Intl Traveler<30days:  No


Traveled to known affect area:  No





History of Present Illness


HPI


53-year-old male presents to the emergency room for evaluation of foreign body 

sensation to his left foot that started yesterday.  Patient states he believes 

he stepped on something yesterday but does not remember actually stepping on 

anything.  States he is a .  Patient states pain is severe when he bears 

weight.  Minimal at rest.  No radiation.  No alleviating factors.  Denies 

history of diabetes.  Last tetanus was less than 5 years ago.





PFSH


Past Medical History


Hx Anticoagulant Therapy:  No


Blood Disorders:  No


Anxiety:  Yes


Cancer:  No


Cardiovascular Problems:  Yes (HTN)


Chemotherapy:  No


Cerebrovascular Accident:  No


Diabetes:  No


Diminished Hearing:  No


Endocrine:  No


Gastrointestinal Disorders:  No


Genitourinary:  No


Hypertension:  Yes


Immune Disorder:  No


Implanted Vascular Access Dvce:  No


Musculoskeletal:  No


Neurologic:  No


Psychiatric:  No


Reproductive:  No


Respiratory:  No


Radiation Therapy:  No





Past Surgical History


Eye Surgery:  Yes (LEFT EYE TEAR DUCT)


Other Surgery:  Yes





Social History


Alcohol Use:  Yes (FEW BEERS DAILY)


Tobacco Use:  Yes (1/4 PPD)


Substance Use:  No (hx of drug use)





Allergies-Medications


(Allergen,Severity, Reaction):  


Coded Allergies:  


     No Known Allergies (Verified  Allergy, Unknown, 4/23/18)


Reported Meds & Prescriptions





Reported Meds & Active Scripts


Active


Lisinopril 10 Mg Tab 20 Mg PO DAILY 30 Days


Reported


Trazodone (Trazodone HCl) 100 Mg Tablet 100 Mg PO HS


Gabapentin 300 Mg Cap 300 Mg PO TID


Vistaril (Hydroxyzine Pamoate) 25 Mg Cap 25 Mg PO TID PRN


Prozac (Fluoxetine HCl) 40 Mg Cap 40 Mg PO DAILY








Review of Systems


Except as stated in HPI:  all other systems reviewed are Neg





Physical Exam


Narrative


GENERAL: Well-nourished, well-developed male in no acute distress.  Afebrile.  

Ambulatory.


SKIN: Focused skin assessment warm/dry.  There is an indurated area in the left 

plantar foot which measures about 2 cm in diameter. It is fluctuant but there 

is no pointing or drainage. There is a zone of inflammation around it but no 

lymphangitis.


HEAD: Normocephalic.


EYES: No scleral icterus. No injection or drainage. 


NECK: Supple, trachea midline. No JVD or lymphadenopathy.


CARDIOVASCULAR: Regular rate and rhythm without murmurs, gallops, or rubs. 


RESPIRATORY: Breath sounds equal bilaterally. No accessory muscle use.


MUSCULOSKELETAL: No cyanosis, or edema.





Data


Data


Last Documented VS





Vital Signs








  Date Time  Temp Pulse Resp B/P (MAP) Pulse Ox O2 Delivery O2 Flow Rate FiO2


 


4/23/18 10:08 98.4 97 16 159/80 (106) 100   








Orders





 Orders


Ketorolac Inj (Toradol Inj) (4/23/18 12:30)


Lidocaine 1% Inj (50 Ml) (Xylocaine 1% I (4/23/18 12:30)


Lidocaine Pf 1% Inj (Xylocaine-Mpf 1% In (4/23/18 12:30)








MDM


Medical Decision Making


Medical Screen Exam Complete:  Yes


Emergency Medical Condition:  Yes


Medical Record Reviewed:  Yes


Differential Diagnosis


Abscess, cellulitis, foreign body


Narrative Course


53-year-old male presents to the emergency room for evaluation of foreign body 

sensation to his left plantar foot that started yesterday.  Patient does not 

actually remember stepping on anything.  Physical exam reveals a small puncture 

wound to the left plantar foot with surrounding erythema.  There is some 

fluctuance around the wound but no significant induration or lymphangitis.  

Wound was explored and abscess was drained, see procedure note for details.  

Patient discharged with Bactrim and told to follow-up with a primary care 

physician or return for worsening symptoms.  He understands and agrees to plan.





Procedures


**Procedure Narrative**


INCISION AND DRAINAGE OF ABSCESS: The area was prepped and was sterilely 

draped.  A subcutaneous wheal of 1% lidocaine with a total number 3 mL was used 

to anesthetize the area properly.  A number 11 scalpel was used to make a 1 cm 

incision across the area of the abscess.  The abscess was drained, complex 

loculations were broken down, and irrigated with normal saline.  Sterile 

dressing applied.





Diagnosis





 Primary Impression:  


 Puncture wound of left foot


 Qualified Codes:  S91.332A - Puncture wound without foreign body, left foot, 

initial encounter


Referrals:  


Primary Care Physician





***Additional Instructions:  


Keep wound clean and dry.


Take Bactrim as directed, until gone.


Follow up with a primary care physician.


Return to emergency room for worsening symptoms, as discussed.


***Med/Other Pt SpecificInfo:  Prescription(s) given


Scripts


Sulfamethoxazole-Trimethoprim (Bactrim DS) 800-160 Mg Tab


1 TAB PO BID for Infection, #20 TAB 0 Refills


   Prov: Milton France MD         4/23/18


Disposition:  01 DISCHARGE HOME


Condition:  Stable











Sabina Ely Apr 23, 2018 13:01

## 2018-05-16 ENCOUNTER — HOSPITAL ENCOUNTER (EMERGENCY)
Dept: HOSPITAL 17 - NEPD | Age: 53
Discharge: HOME | End: 2018-05-16
Payer: SELF-PAY

## 2018-05-16 VITALS
HEART RATE: 96 BPM | TEMPERATURE: 98.9 F | DIASTOLIC BLOOD PRESSURE: 85 MMHG | RESPIRATION RATE: 22 BRPM | OXYGEN SATURATION: 97 % | SYSTOLIC BLOOD PRESSURE: 172 MMHG

## 2018-05-16 VITALS — BODY MASS INDEX: 29.74 KG/M2 | HEIGHT: 68 IN | WEIGHT: 196.21 LBS

## 2018-05-16 DIAGNOSIS — F41.9: ICD-10-CM

## 2018-05-16 DIAGNOSIS — I10: ICD-10-CM

## 2018-05-16 DIAGNOSIS — J40: ICD-10-CM

## 2018-05-16 DIAGNOSIS — Z72.0: ICD-10-CM

## 2018-05-16 DIAGNOSIS — R09.81: ICD-10-CM

## 2018-05-16 DIAGNOSIS — Z91.14: ICD-10-CM

## 2018-05-16 DIAGNOSIS — J44.1: Primary | ICD-10-CM

## 2018-05-16 PROCEDURE — 71046 X-RAY EXAM CHEST 2 VIEWS: CPT

## 2018-05-16 PROCEDURE — 99283 EMERGENCY DEPT VISIT LOW MDM: CPT

## 2018-05-16 NOTE — PD
HPI


Chief Complaint:  Respiratory Symptoms


Time Seen by Provider:  10:03


Travel History


International Travel<30 days:  No


Contact w/Intl Traveler<30days:  No


Traveled to known affect area:  No





History of Present Illness


HPI


53-year-old male came to the emergency room with history of cough, nasal 

congestion, head fullness and not feeling well for past 2 weeks.  Patient is a 

smoker.  He says he has occasionally felt febrile but has not checked his 

temperature.  Patient was afebrile in the emergency room.  He has psych history 

and history of hypertension.  He is continuing with his psych medications and 

Justin Gimenez has been prescribing them.  However he has not taken his 

hypertension medication for quite few days because he ran out of it and Justin Gimenez would not prescribe it to him.  He is trying to find a primary care 

physician.  Vital signs in triage were within normal limits.  Patient does not 

complain of any chest pain.  He did not appear to be in any significant 

distress in the room.  Patient says when he coughs sometimes he gets yellowish 

sputum.





PFSH


Past Medical History


*** Narrative Medical


List of his past medical, surgical, social and family history is reviewed from 

the nursing note.


Hx Anticoagulant Therapy:  No


Blood Disorders:  No


Anxiety:  Yes


Cancer:  No


Cardiovascular Problems:  Yes (HTN)


Chemotherapy:  No


Cerebrovascular Accident:  No


Diabetes:  No


Diminished Hearing:  No


Endocrine:  No


Gastrointestinal Disorders:  No


Genitourinary:  No


Hypertension:  Yes


Immune Disorder:  No


Implanted Vascular Access Dvce:  No


Musculoskeletal:  No


Neurologic:  No


Psychiatric:  No


Reproductive:  No


Respiratory:  No


Radiation Therapy:  No





Past Surgical History


Eye Surgery:  Yes (LEFT EYE TEAR DUCT)


Other Surgery:  Yes





Social History


Alcohol Use:  Yes (FEW BEERS DAILY)


Tobacco Use:  Yes (1/4 PPD)


Substance Use:  No (hx of drug use)





Allergies-Medications


(Allergen,Severity, Reaction):  


Coded Allergies:  


     No Known Allergies (Verified  Allergy, Unknown, 5/16/18)


Comments


No known drug allergies


Reported Meds & Prescriptions





Reported Meds & Active Scripts


Active


Amoxicillin 500 Mg Cap 500 Mg PO BID 10 Days


Nasonex Nasal Spray (Mometasone Furoate) 50 Mcg/Act Naspr 2 [place] EACH NARE 

DAILY


Ventolin Hfa 18 GM Inh (Albuterol Sulfate) 90 Mcg/Act Aer 2 Puff INH Q4-6H PRN


Zithromax Z-Jose (Azithromycin) 250 Mg Dspk 250 Mg PO AS DIRECTED


     500 MG (2 tabs) day 1, then 1 tab days 2-5.


Lisinopril 10 Mg Tab 20 Mg PO DAILY 30 Days


Reported


Cymbalta DR (Duloxetine HCl) 60 Mg Capdr 60 Mg PO DAILY


Trazodone (Trazodone HCl) 100 Mg Tablet 100 Mg PO HS


Gabapentin 300 Mg Cap 300 Mg PO TID


Vistaril (Hydroxyzine Pamoate) 25 Mg Cap 25 Mg PO TID PRN


Prozac (Fluoxetine HCl) 40 Mg Cap 40 Mg PO DAILY





Narrative Medication


List of his home medications reviewed from the nursing note





Review of Systems


Except as stated in HPI:  all other systems reviewed are Neg


HENT:  Positive: Headaches, Congestion


Respiratory:  Positive: Cough





Physical Exam


Narrative


GENERAL: Awake, alert, mildest


SKIN: Focused skin assessment warm/dry.


HEAD: Atraumatic. Normocephalic. 


EYES: Pupils equal and round. No scleral icterus. No injection or drainage. 


ENT: No nasal bleeding or discharge.  Mucous membranes pink and moist.


NECK: Trachea midline. No JVD. 


CARDIOVASCULAR: Regular rate and rhythm.  No murmur appreciated.


RESPIRATORY: No accessory muscle use. Clear to auscultation. Breath sounds 

equal bilaterally. 


GASTROINTESTINAL: Abdomen soft, non-tender, nondistended. Hepatic and splenic 

margins not palpable. 


MUSCULOSKELETAL: No obvious deformities. No clubbing.  No cyanosis.  No edema. 


NEUROLOGICAL: Awake and alert. No obvious cranial nerve deficits.  Motor 

grossly within normal limits. Normal speech.


PSYCHIATRIC: Appropriate mood and affect; insight and judgment normal.





Data


Data


Last Documented VS





Orders





 Orders


Chest, Pa & Lat (5/16/18 )


Ed Discharge Order (5/16/18 11:15)








MDM


Medical Decision Making


Medical Screen Exam Complete:  Yes


Emergency Medical Condition:  Yes


Medical Record Reviewed:  Yes


Differential Diagnosis


COPD exacerbation, bronchitis, sinusitis, medication noncompliance, pneumonia


Narrative Course


11:30 AM chest x-ray was within normal limit.  Patient will be discharged home 

with prescriptions.  He needs to quit smoking in order to get feel better.





Procedures


EKG Prior to Arrival:  No





Diagnosis





 Primary Impression:  


 COPD exacerbation


 Additional Impressions:  


 Bronchitis


 Needs smoking cessation education


 History of medication noncompliance


Referrals:  


[facility]


1 week





***Additional Instructions:  


Take the medication as per the prescription direction.  Follow-up with the walk-

in clinic in a week whose address has been provided to you on the discharge 

instructions.  You should refrain from smoking since her condition is just 

going to get worse if you continue in spite of the medications.  Return to the 

ER if condition worsens or any other new concerns.


***Med/Other Pt SpecificInfo:  Prescription(s) given


Scripts


Amoxicillin (Amoxicillin) 500 Mg Cap


500 MG PO BID for Infection for 10 Days, #20 CAP 0 Refills


   Prov: Karissa Etienne MD         5/16/18 


Mometasone Nasal Spray (Nasonex Nasal Spray) 50 Mcg/Act Naspr


2 SPR[place] EACH NARE DAILY for Allergy Management, #1 BOTTLE 0 Refills


   Prov: Karissa Etienne MD         5/16/18 


Albuterol 18 GM Inh (Ventolin Hfa 18 GM Inh) 90 Mcg/Act Aer


2 PUFF INH Q4-6H Y for SHORTNESS OF BREATH, #1 INHALER 0 Refills


   Prov: Karissa Etienne MD         5/16/18 


Azithromycin (Zithromax Z-Jose) 250 Mg Dspk


250 MG PO AS DIRECTED for Infection, #1 DSPK 0 Refills


   500 MG (2 tabs) day 1, then 1 tab days 2-5.


   Prov: Karissa Etienne MD         5/16/18 


Lisinopril (Lisinopril) 10 Mg Tab


20 MG PO DAILY for 30 Days, TAB


   Prov: Karissa Etienne MD         5/16/18


Disposition:  01 DISCHARGE HOME


Condition:  Stable











Karissa Etienne MD May 16, 2018 10:06

## 2018-05-16 NOTE — RADRPT
EXAM DATE/TIME:  05/16/2018 10:55 

 

[facility] COMPARISON:     

CHEST PA & LAT, October 27, 2017, 10:24.

 

                     

INDICATIONS :     

Cough and congestion.

                     

 

MEDICAL HISTORY :            

Hypertension. Anxiety. Claustrophobia. MRSA.   

 

SURGICAL HISTORY :        

Left eye tear duct surgery.

 

ENCOUNTER:     

Initial                                        

 

ACUITY:     

1 day      

 

PAIN SCORE:     

0/10

 

LOCATION:     

Bilateral chest 

 

FINDINGS:     

Mild hyperinflation..  The cardiomediastinal contours are unremarkable.  Degenerative changes thoraci
c spine.

 

CONCLUSION:     Hyperinflation otherwise negative 

 

 

 Rolo Goel MD FACR on May 16, 2018 at 11:00           

Board Certified Radiologist.

 This report was verified electronically.

## 2018-05-18 ENCOUNTER — HOSPITAL ENCOUNTER (EMERGENCY)
Dept: HOSPITAL 17 - NEPD | Age: 53
Discharge: LEFT BEFORE DISCHARGE | End: 2018-05-18
Payer: SELF-PAY

## 2018-05-18 VITALS
RESPIRATION RATE: 20 BRPM | OXYGEN SATURATION: 98 % | SYSTOLIC BLOOD PRESSURE: 164 MMHG | HEART RATE: 109 BPM | DIASTOLIC BLOOD PRESSURE: 91 MMHG | TEMPERATURE: 99.2 F

## 2018-05-18 DIAGNOSIS — F17.210: ICD-10-CM

## 2018-05-18 DIAGNOSIS — R05: Primary | ICD-10-CM

## 2018-05-18 PROCEDURE — 99281 EMR DPT VST MAYX REQ PHY/QHP: CPT

## 2018-05-18 NOTE — PD
HPI


.


Cold symptoms


Chief Complaint:  Cold / Flu Symptoms


Time Seen by Provider:  10:19


Travel History


International Travel<30 days:  No


Contact w/Intl Traveler<30days:  No


Traveled to known affect area:  No





History of Present Illness


HPI


This patient presents for the evaluation of cold symptoms.  Onset was several 

weeks ago.  He reports that he lives in a retirement house and that several people 

at the retirement house have similar symptoms.  He states that he cannot breathe.  

He states that other people at the retirement house have come here and been given 

nebulizer treatments and given an inhaler.  He was seen here 2 days ago for his 

symptoms.  He had chest x-ray which was negative.  He was given prescriptions 

for amoxicillin, Zithromax, Nasonex, albuterol inhaler and lisinopril.  He 

states that the only medications that he has had filled for the free 

antibiotics.  He comes in to us this morning expecting that we will give him an 

inhaler.





Onset was several weeks ago


Progression is continuous


Modifying factors are cigarette smoking


Associated symptoms include headache and myalgias





PFSH


Past Medical History


Hx Anticoagulant Therapy:  No


Blood Disorders:  No


Anxiety:  Yes


Cancer:  No


Cardiovascular Problems:  Yes (HTN)


Chemotherapy:  No


Cerebrovascular Accident:  No


Diabetes:  No


Diminished Hearing:  No


Endocrine:  No


Gastrointestinal Disorders:  No


Genitourinary:  No


Hypertension:  Yes


Immune Disorder:  No


Implanted Vascular Access Dvce:  No


Musculoskeletal:  No


Neurologic:  No


Psychiatric:  No


Reproductive:  No


Respiratory:  No


Radiation Therapy:  No





Past Surgical History


Eye Surgery:  Yes (LEFT EYE TEAR DUCT)


Other Surgery:  Yes (TOE REMOVAL ON RIGHT FOOT)





Social History


Alcohol Use:  Yes (FEW BEERS DAILY)


Tobacco Use:  Yes (1/4 PPD)


Substance Use:  No (hx of drug use)





Allergies-Medications


(Allergen,Severity, Reaction):  


Coded Allergies:  


     No Known Allergies (Verified  Allergy, Unknown, 5/16/18)


Reported Meds & Prescriptions





Reported Meds & Active Scripts


Active


Amoxicillin 500 Mg Cap 500 Mg PO BID 10 Days


Nasonex Nasal Spray (Mometasone Furoate) 50 Mcg/Act Naspr 2 [place] EACH NARE 

DAILY


Ventolin Hfa 18 GM Inh (Albuterol Sulfate) 90 Mcg/Act Aer 2 Puff INH Q4-6H PRN


Zithromax Z-Jose (Azithromycin) 250 Mg Dspk 250 Mg PO AS DIRECTED


     500 MG (2 tabs) day 1, then 1 tab days 2-5.


Lisinopril 10 Mg Tab 20 Mg PO DAILY 30 Days


Reported


Cymbalta DR (Duloxetine HCl) 60 Mg Capdr 60 Mg PO DAILY


Trazodone (Trazodone HCl) 100 Mg Tablet 100 Mg PO HS


Gabapentin 300 Mg Cap 300 Mg PO TID


Vistaril (Hydroxyzine Pamoate) 25 Mg Cap 25 Mg PO TID PRN


Prozac (Fluoxetine HCl) 40 Mg Cap 40 Mg PO DAILY








Review of Systems


Except as stated in HPI:  all other systems reviewed are Neg





Physical Exam


Narrative


GENERAL: Sitting in the chair in the room and noticed


SKIN:  warm/dry.


HEAD: Normocephalic.  Atraumatic.


EYES: Pupils equal and round. No scleral icterus. No injection or drainage. 


ENT: No nasal bleeding or discharge.  Mucous membranes pink and moist.


NECK: Trachea midline.  Full range of motion without pain.. 


CARDIOVASCULAR: Regular rate and rhythm.  


RESPIRATORY: Speaking in complete sentences without any respiratory distress.  

He did have an asthmatic sounding cough while I was in the room.  No accessory 

muscle use.  Lungs are clear to auscultation. Breath sounds equal bilaterally.  

Respiratory rate is 20 with oxygen saturation of 98% on room air.


MUSCULOSKELETAL: No obvious deformities. 


NEUROLOGICAL: Awake and alert. No obvious cranial nerve deficits.  Motor 

grossly within normal limits. Normal speech.


PSYCHIATRIC: Appropriate mood and affect; insight and judgment normal.





Data


Data


Last Documented VS





Vital Signs








  Date Time  Temp Pulse Resp B/P (MAP) Pulse Ox O2 Delivery O2 Flow Rate FiO2


 


5/18/18 10:16 99.2 109 20 164/91 (115) 98   











MDM


Medical Decision Making


Medical Screen Exam Complete:  Yes


Emergency Medical Condition:  No


Differential Diagnosis


Differential diagnosis includes but is not limited to influenza, upper 

respiratory infection, bronchitis, pneumonia


Narrative Course


This patient presents with ongoing cough and cold symptoms.  He was seen here 2 

days ago for same.  He had a negative chest x-ray.  He was discharged with 

several different medications which she has not had filled.





I discussed the situation with the nursing staff to determine if this would be 

a patient who would be appropriately discharged as an E[facility] no.  They agree that 

this is appropriate in this case.  The patient is having no respiratory 

distress.  His lungs are clear.  He has already been seen here and given 

prescriptions for appropriate medications.





A medical screening exam was performed: 


At the time of evaluation the presenting medical condition was determined not 

to be of an emergent nature. 


The patient was given the option of receiving additional care, but declined. 


Patient was given options for additional community resources from which to 

obtain care.


The Patient Has Been advised to seek medical attention for their presenting 

complaint.


The patient has been advised to return to the ER at any time if an emergent 

condition develops.





Diagnosis





 Primary Impression:  


 Encounter for medical screening examination


Condition:  Stable











Diana Jane MD May 18, 2018 10:35

## 2023-07-24 NOTE — HHI.PR
Patient hit it head by golf club. No LOC. Lac to head, currently bandaged. Patient a/ox4, ambulatory with steady gait. Patients respirations even and non labored. Subjective


Remarks


Pt tells me that pain is better controlled. Denies any CP/SOB/N/V


no other concerns





Objective


Vitals





Vital Signs








  Date Time  Temp Pulse Resp B/P (MAP) Pulse Ox O2 Delivery O2 Flow Rate FiO2


 


9/3/17 08:00 96.9 74 16 171/89 (116) 98   


 


9/3/17 00:00 95.8 63 21 133/89 (104) 98   


 


9/2/17 20:00 97.6 63 21 162/74 (103) 98   


 


9/2/17 17:50     98   21


 


9/2/17 16:00 97.4 89 17 164/91 (115) 99   


 


9/2/17 12:00 98.2 88 16 146/86 (106) 98   














I/O      


 


 9/2/17 9/2/17 9/2/17 9/3/17 9/3/17 9/3/17





 06:59 14:59 22:59 06:59 14:59 22:59


 


Intake Total 647 ml 1011 ml 1290 ml 240 ml  


 


Output Total   3620 ml 650 ml  


 


Balance 647 ml 1011 ml -2330 ml -410 ml  


 


      


 


Intake Oral   1290 ml 240 ml  


 


IV Total 647 ml 1011 ml    


 


Output Urine Total   3620 ml 650 ml  


 


# Voids   1   


 


# Bowel Movements   1   








Result Diagram:  


9/1/17 0436                                                                    

            9/2/17 0445





Imaging





Last Impressions








Finger X-Ray 8/31/17 1504 Signed





Impressions: 





 Service Date/Time:  Thursday, August 31, 2017 15:33 - CONCLUSION:  

Unremarkable 





 examination of the right first finger.  No radiopaque foreign body.     Lul Zacarias Jr., MD 








Objective Remarks


GENERAL: Well-developed well-nourished. 


SKIN:  Right hand w dressing in place. d/c/i


HEENT: EOMI, trachea midline


CARDIOVASCULAR: Regular rate and rhythm.  No murmur appreciated.


RESPIRATORY: No accessory muscle use. Clear to auscultation. Breath sounds 

equal bilaterally. 


GASTROINTESTINAL: Abdomen soft, non-tender, nondistended. Bowel sounds x4.


MUSCULOSKELETAL:no edema noted in lower extremities, moves all extremities.


NEUROLOGICAL: Awake and alert. No focal neurological deficits. Normal speech.


PSYCHIATRIC: Appropriate mood and affect; insight and judgment normal.





A/P


Assessment and Plan


52-year-old male with a past medical history of HTN who presented with right 

thumb pain and swelling





Right thumb cellulitis: s/p I&D of the right thumb and flexor tendon sheath 

drainage POD 3


-wound cx growing MRSA. ID following and recommends continuing the vanco. 

monitor creatinine 


-Management per hand sx.


-Pain control with oral and intravenous narcotics as needed


-Elevate arm per Hand sx recs





Alcohol abuse:/Withdrawals.  Thiamine, folate, multivitamins. CIWA protocol.





Hypertension: Chronic, stable.   lisinopril increased to 20mg po daily.  

Clonidine as needed.





Tobacco abuse: Cessation counseling.  Nicotine patch.





DVT prophylaxis: SCDs


Discharge Planning


awaiting clearance from consultants.


continue pain control











Buffy Langford MD Sep 3, 2017 11:59